# Patient Record
Sex: FEMALE | Race: ASIAN | Employment: UNEMPLOYED | URBAN - METROPOLITAN AREA
[De-identification: names, ages, dates, MRNs, and addresses within clinical notes are randomized per-mention and may not be internally consistent; named-entity substitution may affect disease eponyms.]

---

## 2017-12-28 ENCOUNTER — TELEPHONE (OUTPATIENT)
Dept: FAMILY MEDICINE CLINIC | Facility: CLINIC | Age: 57
End: 2017-12-28

## 2017-12-29 ENCOUNTER — OFFICE VISIT (OUTPATIENT)
Dept: FAMILY MEDICINE CLINIC | Facility: CLINIC | Age: 57
End: 2017-12-29

## 2017-12-29 ENCOUNTER — HOSPITAL ENCOUNTER (OUTPATIENT)
Dept: GENERAL RADIOLOGY | Age: 57
Discharge: HOME | End: 2017-12-29
Attending: FAMILY MEDICINE

## 2017-12-29 VITALS
HEIGHT: 62 IN | TEMPERATURE: 98 F | WEIGHT: 150 LBS | SYSTOLIC BLOOD PRESSURE: 122 MMHG | DIASTOLIC BLOOD PRESSURE: 70 MMHG | HEART RATE: 80 BPM | BODY MASS INDEX: 27.6 KG/M2 | RESPIRATION RATE: 12 BRPM

## 2017-12-29 DIAGNOSIS — R05.9 COUGH: Primary | ICD-10-CM

## 2017-12-29 DIAGNOSIS — R05.9 COUGH: ICD-10-CM

## 2017-12-29 DIAGNOSIS — J45.40 MODERATE PERSISTENT ASTHMA WITHOUT COMPLICATION: ICD-10-CM

## 2017-12-29 PROCEDURE — 99203 OFFICE O/P NEW LOW 30 MIN: CPT | Performed by: FAMILY MEDICINE

## 2017-12-29 PROCEDURE — 71020 XR CHEST PA + LAT CHEST (CPT=71020): CPT | Performed by: FAMILY MEDICINE

## 2017-12-29 RX ORDER — MONTELUKAST SODIUM 10 MG/1
10 TABLET ORAL NIGHTLY
COMMUNITY

## 2017-12-29 RX ORDER — FLUTICASONE PROPIONATE AND SALMETEROL 250; 50 UG/1; UG/1
2 POWDER RESPIRATORY (INHALATION) EVERY 12 HOURS SCHEDULED
COMMUNITY

## 2017-12-29 RX ORDER — BENZONATATE 100 MG/1
100 CAPSULE ORAL 3 TIMES DAILY PRN
Qty: 20 CAPSULE | Refills: 0 | Status: SHIPPED | OUTPATIENT
Start: 2017-12-29

## 2017-12-29 RX ORDER — GUAIFENESIN AND CODEINE PHOSPHATE 100; 10 MG/5ML; MG/5ML
SOLUTION ORAL
Qty: 120 ML | Refills: 0 | Status: SHIPPED | OUTPATIENT
Start: 2017-12-29 | End: 2018-01-12

## 2017-12-29 RX ORDER — LEVOFLOXACIN 500 MG/1
500 TABLET, FILM COATED ORAL DAILY
COMMUNITY
Start: 2017-12-27

## 2017-12-29 RX ORDER — GUAIFENESIN 600 MG
1200 TABLET, EXTENDED RELEASE 12 HR ORAL 2 TIMES DAILY
COMMUNITY
End: 2018-02-15

## 2017-12-29 RX ORDER — ALBUTEROL SULFATE 90 UG/1
AEROSOL, METERED RESPIRATORY (INHALATION) EVERY 6 HOURS PRN
COMMUNITY

## 2017-12-29 NOTE — PROGRESS NOTES
Vladimir Lomeli is a 62year old female and is new to our office. Patient presents today to establish care. The patient's history includes:  · Cough and cold x 3 days. Taking mucinex and started Levaquin x 3 days (gets from [facility]). No fever. daily    Exercise No     Social History Narrative   None on file       REVIEW OF SYSTEMS:  GENERAL: feels well otherwise  LUNGS: denies shortness of breath  CARDIOVASCULAR: denies chest pain    EXAM:  /70   Pulse 80   Temp 98.1 °F (36.7 °C) (Oral)

## 2018-01-03 ENCOUNTER — TELEPHONE (OUTPATIENT)
Dept: FAMILY MEDICINE CLINIC | Facility: CLINIC | Age: 58
End: 2018-01-03

## 2018-02-15 ENCOUNTER — OFFICE VISIT (OUTPATIENT)
Dept: FAMILY MEDICINE CLINIC | Facility: CLINIC | Age: 58
End: 2018-02-15

## 2018-02-15 VITALS
HEART RATE: 84 BPM | TEMPERATURE: 98 F | BODY MASS INDEX: 27.97 KG/M2 | WEIGHT: 152 LBS | HEIGHT: 62 IN | DIASTOLIC BLOOD PRESSURE: 80 MMHG | SYSTOLIC BLOOD PRESSURE: 120 MMHG

## 2018-02-15 DIAGNOSIS — J45.40 MODERATE PERSISTENT ASTHMA WITHOUT COMPLICATION: Primary | ICD-10-CM

## 2018-02-15 PROCEDURE — 99213 OFFICE O/P EST LOW 20 MIN: CPT | Performed by: FAMILY MEDICINE

## 2018-02-15 RX ORDER — PREDNISONE 10 MG/1
TABLET ORAL
Qty: 30 TABLET | Refills: 0 | Status: SHIPPED | OUTPATIENT
Start: 2018-02-15

## 2018-02-15 RX ORDER — ESOMEPRAZOLE MAGNESIUM 40 MG/1
CAPSULE, DELAYED RELEASE ORAL
COMMUNITY
Start: 2017-12-26

## 2018-02-16 NOTE — PROGRESS NOTES
HPI:   Sierra Carolina is a 62year old female. Cough x 6 weeks. Was seen at that time. Had normal CXR. Had started levaquin from [facility]. Then went on augments from dentist.  Feels sx did improve, but cough has persisted.   Takes advair d encounter diagnosis)  · Prednisone taper as directed. · Call/return if sx do not improve. No orders of the defined types were placed in this encounter.     Meds & Refills for this Visit:  Signed Prescriptions Disp Refills    predniSONE 10 MG Oral Tab 30

## 2019-01-08 ENCOUNTER — OFFICE VISIT (OUTPATIENT)
Dept: FAMILY MEDICINE | Age: 59
End: 2019-01-08

## 2019-01-08 ENCOUNTER — LAB SERVICES (OUTPATIENT)
Dept: LAB | Age: 59
End: 2019-01-08

## 2019-01-08 VITALS
WEIGHT: 153 LBS | SYSTOLIC BLOOD PRESSURE: 124 MMHG | DIASTOLIC BLOOD PRESSURE: 76 MMHG | BODY MASS INDEX: 28.16 KG/M2 | HEART RATE: 108 BPM | HEIGHT: 62 IN | TEMPERATURE: 97.4 F | RESPIRATION RATE: 16 BRPM

## 2019-01-08 DIAGNOSIS — D50.9 IRON DEFICIENCY ANEMIA, UNSPECIFIED IRON DEFICIENCY ANEMIA TYPE: Primary | ICD-10-CM

## 2019-01-08 DIAGNOSIS — D64.9 ANEMIA, UNSPECIFIED TYPE: ICD-10-CM

## 2019-01-08 DIAGNOSIS — E87.6 HYPOKALEMIA: ICD-10-CM

## 2019-01-08 DIAGNOSIS — J45.40 MODERATE PERSISTENT ASTHMA WITHOUT COMPLICATION: ICD-10-CM

## 2019-01-08 DIAGNOSIS — D50.9 IRON DEFICIENCY ANEMIA, UNSPECIFIED IRON DEFICIENCY ANEMIA TYPE: ICD-10-CM

## 2019-01-08 LAB
25(OH)D3 SERPL-MCNC: 37.5 NG/ML (ref 30–100)
BUN SERPL-MCNC: 12 MG/DL (ref 7–20)
CALCIUM SERPL-MCNC: 9.9 MG/DL (ref 8.6–10.6)
CHLORIDE SERPL-SCNC: 103 MMOL/L (ref 96–107)
CO2 SERPL-SCNC: 29 MMOL/L (ref 22–32)
CREAT SERPL-MCNC: 0.7 MG/DL (ref 0.5–1.4)
DIFFERENTIAL TYPE: ABNORMAL
FERRITIN SERPL-MCNC: 99 NG/ML (ref 11–264)
GFR SERPL CREATININE-BSD FRML MDRD: >60 ML/MIN/{1.73M2}
GFR SERPL CREATININE-BSD FRML MDRD: >60 ML/MIN/{1.73M2}
GLUCOSE SERPL-MCNC: 93 MG/DL (ref 70–200)
HEMATOCRIT: 40.2 % (ref 34–45)
HEMOGLOBIN: 12.4 G/DL (ref 11.2–15.7)
IRON SATN MFR SERPL: 18 % (ref 9–55)
IRON SERPL-MCNC: 64 UG/DL (ref 37–170)
LYMPH PERCENT: 21.6 % (ref 20.5–51.1)
LYMPHOCYTE ABSOLUTE #: 1.3 10*3/UL (ref 1.2–3.4)
MEAN CORPUSCULAR HGB CONCENTRATION: 30.8 % (ref 32–36)
MEAN CORPUSCULAR HGB: 21.8 PG (ref 27–34)
MEAN CORPUSCULAR VOLUME: 70.7 FL (ref 79–95)
MEAN PLATELET VOLUME: 10.1 FL (ref 8.6–12.4)
MIXED %: 10.6 % (ref 4.3–12.9)
MIXED ABSOLUTE #: 0.7 10*3/UL (ref 0.2–0.9)
NEUTROPHIL ABSOLUTE #: 4.2 10*3/UL (ref 1.4–6.5)
NEUTROPHIL PERCENT: 67.8 % (ref 34–73.5)
PLATELET COUNT: 328 10*3/UL (ref 150–400)
POTASSIUM SERPL-SCNC: 3.8 MMOL/L (ref 3.5–5.3)
RED BLOOD CELL COUNT: 5.69 10*6/UL (ref 3.7–5.2)
RED CELL DISTRIBUTION WIDTH: 14.1 % (ref 11.3–14.8)
SODIUM SERPL-SCNC: 141 MMOL/L (ref 136–146)
TIBC SERPL-MCNC: 353 UG/DL (ref 250–450)
WHITE BLOOD CELL COUNT: 6.2 10*3/UL (ref 4–10)

## 2019-01-08 PROCEDURE — 99203 OFFICE O/P NEW LOW 30 MIN: CPT | Performed by: FAMILY MEDICINE

## 2019-01-08 PROCEDURE — 83550 IRON BINDING TEST: CPT | Performed by: FAMILY MEDICINE

## 2019-01-08 PROCEDURE — 36415 COLL VENOUS BLD VENIPUNCTURE: CPT | Performed by: FAMILY MEDICINE

## 2019-01-08 PROCEDURE — 82728 ASSAY OF FERRITIN: CPT | Performed by: FAMILY MEDICINE

## 2019-01-08 PROCEDURE — 85025 COMPLETE CBC W/AUTO DIFF WBC: CPT | Performed by: FAMILY MEDICINE

## 2019-01-08 PROCEDURE — 80048 BASIC METABOLIC PNL TOTAL CA: CPT | Performed by: FAMILY MEDICINE

## 2019-01-08 PROCEDURE — 83540 ASSAY OF IRON: CPT | Performed by: FAMILY MEDICINE

## 2019-01-08 PROCEDURE — 82306 VITAMIN D 25 HYDROXY: CPT | Performed by: FAMILY MEDICINE

## 2019-01-08 RX ORDER — MONTELUKAST SODIUM 10 MG/1
10 TABLET ORAL EVERY EVENING
Qty: 30 TABLET | Refills: 5 | Status: SHIPPED | OUTPATIENT
Start: 2019-01-08 | End: 2019-12-03 | Stop reason: SDUPTHER

## 2019-01-08 RX ORDER — ALBUTEROL SULFATE 90 UG/1
1 AEROSOL, METERED RESPIRATORY (INHALATION) EVERY 6 HOURS PRN
COMMUNITY
End: 2019-05-07 | Stop reason: SDUPTHER

## 2019-01-08 RX ORDER — CETIRIZINE HYDROCHLORIDE 10 MG/1
10 TABLET ORAL DAILY PRN
COMMUNITY
Start: 2018-12-05 | End: 2019-09-03 | Stop reason: SDUPTHER

## 2019-01-08 RX ORDER — ESOMEPRAZOLE MAGNESIUM 40 MG/1
40 CAPSULE, DELAYED RELEASE ORAL DAILY
Qty: 30 CAPSULE | Refills: 5 | Status: SHIPPED | OUTPATIENT
Start: 2019-01-08 | End: 2019-05-20 | Stop reason: SDUPTHER

## 2019-01-08 RX ORDER — ALBUTEROL SULFATE 90 UG/1
2 AEROSOL, METERED RESPIRATORY (INHALATION) EVERY 6 HOURS PRN
Qty: 1 INHALER | Refills: 5 | Status: SHIPPED | OUTPATIENT
Start: 2019-01-08 | End: 2022-05-04 | Stop reason: SDUPTHER

## 2019-01-08 RX ORDER — FLUTICASONE PROPIONATE AND SALMETEROL 250; 50 UG/1; UG/1
1 POWDER RESPIRATORY (INHALATION) DAILY
Qty: 60 EACH | Refills: 2 | Status: SHIPPED | OUTPATIENT
Start: 2019-01-08 | End: 2019-05-09 | Stop reason: DRUGHIGH

## 2019-01-08 RX ORDER — FLUTICASONE PROPIONATE AND SALMETEROL 250; 50 UG/1; UG/1
2 POWDER RESPIRATORY (INHALATION) 2 TIMES DAILY
COMMUNITY
End: 2019-01-08 | Stop reason: SDUPTHER

## 2019-01-08 RX ORDER — MONTELUKAST SODIUM 10 MG/1
10 TABLET ORAL EVERY EVENING
Qty: 30 TABLET | Refills: 5 | Status: SHIPPED | COMMUNITY
Start: 2019-01-08 | End: 2019-01-08 | Stop reason: SDUPTHER

## 2019-01-08 RX ORDER — ESOMEPRAZOLE MAGNESIUM 40 MG/1
40 CAPSULE, DELAYED RELEASE ORAL DAILY
COMMUNITY
Start: 2017-12-26 | End: 2019-01-08 | Stop reason: SDUPTHER

## 2019-01-08 SDOH — HEALTH STABILITY: MENTAL HEALTH: HOW OFTEN DO YOU HAVE A DRINK CONTAINING ALCOHOL?: NEVER

## 2019-01-15 PROBLEM — J45.40 MODERATE PERSISTENT ASTHMA WITHOUT COMPLICATION: Status: ACTIVE | Noted: 2017-12-29

## 2019-03-14 ENCOUNTER — E-ADVICE (OUTPATIENT)
Dept: FAMILY MEDICINE | Age: 59
End: 2019-03-14

## 2019-03-18 RX ORDER — PREDNISONE 5 MG/1
25 TABLET ORAL DAILY
Qty: 25 TABLET | Refills: 0 | Status: SHIPPED | OUTPATIENT
Start: 2019-03-18 | End: 2019-03-23

## 2019-04-23 ENCOUNTER — E-ADVICE (OUTPATIENT)
Dept: FAMILY MEDICINE | Age: 59
End: 2019-04-23

## 2019-04-23 DIAGNOSIS — J45.909 ASTHMA, UNSPECIFIED ASTHMA SEVERITY, UNSPECIFIED WHETHER COMPLICATED, UNSPECIFIED WHETHER PERSISTENT: Primary | ICD-10-CM

## 2019-05-07 ENCOUNTER — LAB SERVICES (OUTPATIENT)
Dept: LAB | Age: 59
End: 2019-05-07

## 2019-05-07 ENCOUNTER — OFFICE VISIT (OUTPATIENT)
Dept: PULMONOLOGY | Age: 59
End: 2019-05-07
Attending: FAMILY MEDICINE

## 2019-05-07 VITALS
HEIGHT: 63 IN | OXYGEN SATURATION: 98 % | DIASTOLIC BLOOD PRESSURE: 60 MMHG | WEIGHT: 155 LBS | SYSTOLIC BLOOD PRESSURE: 106 MMHG | RESPIRATION RATE: 16 BRPM | TEMPERATURE: 98.2 F | BODY MASS INDEX: 27.46 KG/M2 | HEART RATE: 80 BPM

## 2019-05-07 DIAGNOSIS — J32.9 RHINOSINUSITIS: ICD-10-CM

## 2019-05-07 DIAGNOSIS — J30.9 ALLERGIC RHINITIS, UNSPECIFIED SEASONALITY, UNSPECIFIED TRIGGER: ICD-10-CM

## 2019-05-07 DIAGNOSIS — K21.9 GASTROESOPHAGEAL REFLUX DISEASE, ESOPHAGITIS PRESENCE NOT SPECIFIED: ICD-10-CM

## 2019-05-07 DIAGNOSIS — J45.50 SEVERE PERSISTENT ASTHMA WITHOUT COMPLICATION: ICD-10-CM

## 2019-05-07 DIAGNOSIS — J45.50 SEVERE PERSISTENT ASTHMA WITHOUT COMPLICATION: Primary | ICD-10-CM

## 2019-05-07 PROCEDURE — 86003 ALLG SPEC IGE CRUDE XTRC EA: CPT | Performed by: INTERNAL MEDICINE

## 2019-05-07 PROCEDURE — 82785 ASSAY OF IGE: CPT | Performed by: INTERNAL MEDICINE

## 2019-05-07 PROCEDURE — 99244 OFF/OP CNSLTJ NEW/EST MOD 40: CPT | Performed by: INTERNAL MEDICINE

## 2019-05-07 PROCEDURE — 36415 COLL VENOUS BLD VENIPUNCTURE: CPT | Performed by: INTERNAL MEDICINE

## 2019-05-07 PROCEDURE — 94010 BREATHING CAPACITY TEST: CPT | Performed by: INTERNAL MEDICINE

## 2019-05-07 PROCEDURE — 85025 COMPLETE CBC W/AUTO DIFF WBC: CPT | Performed by: INTERNAL MEDICINE

## 2019-05-07 SDOH — HEALTH STABILITY: MENTAL HEALTH: HOW OFTEN DO YOU HAVE A DRINK CONTAINING ALCOHOL?: NEVER

## 2019-05-08 LAB
ANISOCYTOSIS: NORMAL
BASOPHIL %: 0.3 % (ref 0–1.2)
BASOPHIL ABSOLUTE #: 0 10*3/UL (ref 0–0.1)
DIFFERENTIAL TYPE: ABNORMAL
EOSINOPHIL %: 1.1 % (ref 0–10)
EOSINOPHIL ABSOLUTE #: 0.1 10*3/UL (ref 0–0.5)
HEMATOCRIT: 38.7 % (ref 34–45)
HEMOGLOBIN: 11.8 G/DL (ref 11.2–15.7)
HONEY BEE IGE QN: <0.1 KU/L (ref 0–0.1)
HYPOCHROMASIA: NORMAL
LYMPH PERCENT: 20.3 % (ref 20.5–51.1)
LYMPHOCYTE ABSOLUTE #: 1.6 10*3/UL (ref 1.2–3.4)
MEAN CORPUSCULAR HGB CONCENTRATION: 30.5 % (ref 32–36)
MEAN CORPUSCULAR HGB: 21.3 PG (ref 27–34)
MEAN CORPUSCULAR VOLUME: 69.9 FL (ref 79–95)
MEAN PLATELET VOLUME: 11.4 FL (ref 8.6–12.4)
MICROCYTES: NORMAL
MONOCYTE ABSOLUTE #: 0.6 10*3/UL (ref 0.2–0.9)
MONOCYTE PERCENT: 8.1 % (ref 4.3–12.9)
NEUTROPHIL ABSOLUTE #: 5.6 10*3/UL (ref 1.4–6.5)
NEUTROPHIL PERCENT: 70.2 % (ref 34–73.5)
PAPER WASP IGE QN: <0.1 KU/L (ref 0–0.1)
PLATELET COUNT: 400 10*3/UL (ref 150–400)
RED BLOOD CELL COUNT: 5.54 10*6/UL (ref 3.7–5.2)
RED CELL DISTRIBUTION WIDTH: 16.1 % (ref 11.3–14.8)
TOTAL IGE SMQN RAST: <2 KU/L (ref 0–100)
WHITE BLOOD CELL COUNT: 7.9 10*3/UL (ref 4–10)
WHITEFACED HORNET IGE QN: <0.1 KU/L (ref 0–0.1)
YELLOW HORNET IGE QN: <0.1 KU/L (ref 0–0.1)
YELLOW JACKET IGE QN: <0.1 KU/L (ref 0–0.1)

## 2019-05-10 RX ORDER — FLUTICASONE PROPIONATE AND SALMETEROL 500; 50 UG/1; UG/1
1 POWDER RESPIRATORY (INHALATION) 2 TIMES DAILY
Qty: 1 EACH | Refills: 3 | Status: SHIPPED | OUTPATIENT
Start: 2019-05-10 | End: 2019-08-07 | Stop reason: DRUGHIGH

## 2019-05-20 ENCOUNTER — OFFICE VISIT (OUTPATIENT)
Dept: GASTROENTEROLOGY | Age: 59
End: 2019-05-20
Attending: INTERNAL MEDICINE

## 2019-05-20 VITALS
HEIGHT: 63 IN | SYSTOLIC BLOOD PRESSURE: 104 MMHG | WEIGHT: 147 LBS | DIASTOLIC BLOOD PRESSURE: 72 MMHG | BODY MASS INDEX: 26.05 KG/M2

## 2019-05-20 DIAGNOSIS — Z12.11 SPECIAL SCREENING FOR MALIGNANT NEOPLASMS, COLON: ICD-10-CM

## 2019-05-20 DIAGNOSIS — K21.9 GERD WITHOUT ESOPHAGITIS: Primary | ICD-10-CM

## 2019-05-20 PROCEDURE — 99244 OFF/OP CNSLTJ NEW/EST MOD 40: CPT | Performed by: INTERNAL MEDICINE

## 2019-05-20 RX ORDER — SIMETHICONE 125 MG
TABLET,CHEWABLE ORAL
Qty: 2 TABLET | Refills: 0 | Status: SHIPPED | OUTPATIENT
Start: 2019-05-20 | End: 2019-07-04

## 2019-05-20 RX ORDER — SODIUM, POTASSIUM,MAG SULFATES 17.5-3.13G
SOLUTION, RECONSTITUTED, ORAL ORAL
Qty: 1 BOTTLE | Refills: 0 | Status: SHIPPED | OUTPATIENT
Start: 2019-05-20 | End: 2019-07-04

## 2019-05-20 RX ORDER — ESOMEPRAZOLE MAGNESIUM 40 MG/1
40 CAPSULE, DELAYED RELEASE ORAL 2 TIMES DAILY
Qty: 30 CAPSULE | Refills: 6 | Status: SHIPPED | OUTPATIENT
Start: 2019-05-20 | End: 2019-08-30 | Stop reason: SDUPTHER

## 2019-05-20 RX ORDER — BISACODYL 5 MG/1
TABLET, DELAYED RELEASE ORAL
Qty: 2 TABLET | Refills: 0 | Status: SHIPPED | OUTPATIENT
Start: 2019-05-20 | End: 2019-07-04

## 2019-06-26 ENCOUNTER — OFFICE VISIT (OUTPATIENT)
Dept: GASTROENTEROLOGY | Age: 59
End: 2019-06-26
Attending: INTERNAL MEDICINE

## 2019-06-26 DIAGNOSIS — Z12.11 SPECIAL SCREENING FOR MALIGNANT NEOPLASMS, COLON: Primary | ICD-10-CM

## 2019-06-26 DIAGNOSIS — K21.9 GASTRO-ESOPHAGEAL REFLUX DISEASE WITHOUT ESOPHAGITIS: ICD-10-CM

## 2019-06-26 DIAGNOSIS — D12.5 BENIGN NEOPLASM OF SIGMOID COLON: ICD-10-CM

## 2019-06-26 DIAGNOSIS — K21.9 GERD WITHOUT ESOPHAGITIS: ICD-10-CM

## 2019-06-26 DIAGNOSIS — Z12.11 ENCOUNTER FOR SCREENING FOR MALIGNANT NEOPLASM OF COLON: ICD-10-CM

## 2019-06-26 DIAGNOSIS — D12.8 BENIGN NEOPLASM OF RECTUM: ICD-10-CM

## 2019-06-26 PROCEDURE — 45380 COLONOSCOPY AND BIOPSY: CPT | Performed by: INTERNAL MEDICINE

## 2019-06-26 PROCEDURE — 45385 COLONOSCOPY W/LESION REMOVAL: CPT | Performed by: INTERNAL MEDICINE

## 2019-06-26 PROCEDURE — 88305 TISSUE EXAM BY PATHOLOGIST: CPT | Performed by: PATHOLOGY

## 2019-06-26 PROCEDURE — 43239 EGD BIOPSY SINGLE/MULTIPLE: CPT | Performed by: INTERNAL MEDICINE

## 2019-07-01 ENCOUNTER — E-ADVICE (OUTPATIENT)
Dept: GASTROENTEROLOGY | Age: 59
End: 2019-07-01

## 2019-07-01 LAB — AP REPORT: NORMAL

## 2019-07-15 ENCOUNTER — APPOINTMENT (OUTPATIENT)
Dept: PULMONOLOGY | Age: 59
End: 2019-07-15
Attending: FAMILY MEDICINE

## 2019-08-02 ENCOUNTER — APPOINTMENT (OUTPATIENT)
Dept: PULMONOLOGY | Age: 59
End: 2019-08-02

## 2019-08-07 ENCOUNTER — OFFICE VISIT (OUTPATIENT)
Dept: PULMONOLOGY | Age: 59
End: 2019-08-07

## 2019-08-07 VITALS
HEART RATE: 78 BPM | SYSTOLIC BLOOD PRESSURE: 98 MMHG | RESPIRATION RATE: 14 BRPM | TEMPERATURE: 98.2 F | WEIGHT: 148 LBS | OXYGEN SATURATION: 96 % | BODY MASS INDEX: 27.23 KG/M2 | DIASTOLIC BLOOD PRESSURE: 60 MMHG | HEIGHT: 62 IN

## 2019-08-07 DIAGNOSIS — J32.9 RHINOSINUSITIS: ICD-10-CM

## 2019-08-07 DIAGNOSIS — K21.9 GASTROESOPHAGEAL REFLUX DISEASE WITHOUT ESOPHAGITIS: ICD-10-CM

## 2019-08-07 DIAGNOSIS — J45.50 SEVERE PERSISTENT ASTHMA WITHOUT COMPLICATION: Primary | ICD-10-CM

## 2019-08-07 PROCEDURE — 99214 OFFICE O/P EST MOD 30 MIN: CPT | Performed by: INTERNAL MEDICINE

## 2019-08-07 RX ORDER — FLUTICASONE PROPIONATE AND SALMETEROL 250; 50 UG/1; UG/1
1 POWDER RESPIRATORY (INHALATION) 2 TIMES DAILY
Qty: 1 EACH | Refills: 5 | Status: SHIPPED | OUTPATIENT
Start: 2019-08-07 | End: 2019-08-09 | Stop reason: DRUGHIGH

## 2019-08-07 SDOH — HEALTH STABILITY: MENTAL HEALTH: HOW OFTEN DO YOU HAVE A DRINK CONTAINING ALCOHOL?: NEVER

## 2019-08-09 ENCOUNTER — TELEPHONE (OUTPATIENT)
Dept: PULMONOLOGY | Age: 59
End: 2019-08-09

## 2019-08-09 DIAGNOSIS — J45.40 MODERATE PERSISTENT ASTHMA WITHOUT COMPLICATION: Primary | ICD-10-CM

## 2019-08-09 RX ORDER — FLUTICASONE PROPIONATE AND SALMETEROL 250; 50 UG/1; UG/1
2 POWDER RESPIRATORY (INHALATION) 2 TIMES DAILY
Qty: 1 EACH | Refills: 5 | Status: SHIPPED | OUTPATIENT
Start: 2019-08-09 | End: 2020-01-27 | Stop reason: SDUPTHER

## 2019-08-12 ENCOUNTER — APPOINTMENT (OUTPATIENT)
Dept: PULMONOLOGY | Age: 59
End: 2019-08-12

## 2019-08-16 ENCOUNTER — WALK IN (OUTPATIENT)
Dept: URGENT CARE | Age: 59
End: 2019-08-16

## 2019-08-16 VITALS
OXYGEN SATURATION: 99 % | HEART RATE: 74 BPM | TEMPERATURE: 98.1 F | SYSTOLIC BLOOD PRESSURE: 116 MMHG | RESPIRATION RATE: 16 BRPM | DIASTOLIC BLOOD PRESSURE: 59 MMHG

## 2019-08-16 DIAGNOSIS — B02.9 HERPES ZOSTER WITHOUT COMPLICATION: Primary | ICD-10-CM

## 2019-08-16 PROCEDURE — 99204 OFFICE O/P NEW MOD 45 MIN: CPT | Performed by: INTERNAL MEDICINE

## 2019-08-16 RX ORDER — VALACYCLOVIR HYDROCHLORIDE 1 G/1
1000 TABLET, FILM COATED ORAL 3 TIMES DAILY
Qty: 21 TABLET | Refills: 0 | Status: SHIPPED | OUTPATIENT
Start: 2019-08-16 | End: 2019-08-23

## 2019-08-16 RX ORDER — PREDNISONE 20 MG/1
TABLET ORAL
Qty: 18 TABLET | Refills: 0 | Status: SHIPPED | OUTPATIENT
Start: 2019-08-16 | End: 2019-08-25

## 2019-08-16 RX ORDER — PREDNISONE 10 MG/1
TABLET ORAL
COMMUNITY
Start: 2018-02-15 | End: 2020-01-27 | Stop reason: ALTCHOICE

## 2019-08-16 SDOH — HEALTH STABILITY: MENTAL HEALTH: HOW OFTEN DO YOU HAVE A DRINK CONTAINING ALCOHOL?: NEVER

## 2019-09-04 RX ORDER — ESOMEPRAZOLE MAGNESIUM 40 MG/1
40 CAPSULE, DELAYED RELEASE ORAL 2 TIMES DAILY
Qty: 180 CAPSULE | Refills: 1 | Status: SHIPPED | OUTPATIENT
Start: 2019-09-04 | End: 2019-09-06 | Stop reason: SDUPTHER

## 2019-09-05 RX ORDER — CETIRIZINE HYDROCHLORIDE 10 MG/1
10 TABLET ORAL DAILY PRN
Qty: 90 TABLET | Refills: 0 | Status: SHIPPED | OUTPATIENT
Start: 2019-09-05 | End: 2019-12-22 | Stop reason: SDUPTHER

## 2019-09-06 ENCOUNTER — TELEPHONE (OUTPATIENT)
Dept: GASTROENTEROLOGY | Age: 59
End: 2019-09-06

## 2019-09-06 RX ORDER — ESOMEPRAZOLE MAGNESIUM 40 MG/1
40 CAPSULE, DELAYED RELEASE ORAL 2 TIMES DAILY
Qty: 30 CAPSULE | Refills: 6 | Status: SHIPPED | OUTPATIENT
Start: 2019-09-06 | End: 2020-01-27 | Stop reason: SDUPTHER

## 2019-09-21 ENCOUNTER — E-ADVICE (OUTPATIENT)
Dept: FAMILY MEDICINE | Age: 59
End: 2019-09-21

## 2019-09-21 DIAGNOSIS — J45.40 MODERATE PERSISTENT ASTHMA WITHOUT COMPLICATION: ICD-10-CM

## 2019-09-23 RX ORDER — FLUTICASONE PROPIONATE AND SALMETEROL 250; 50 UG/1; UG/1
2 POWDER RESPIRATORY (INHALATION) 2 TIMES DAILY
Qty: 1 EACH | Refills: 5 | OUTPATIENT
Start: 2019-09-23 | End: 2020-09-22

## 2019-09-24 DIAGNOSIS — J45.40 MODERATE PERSISTENT ASTHMA WITHOUT COMPLICATION: ICD-10-CM

## 2019-09-25 RX ORDER — FLUTICASONE PROPIONATE AND SALMETEROL 250; 50 UG/1; UG/1
POWDER RESPIRATORY (INHALATION)
Refills: 0 | OUTPATIENT
Start: 2019-09-25

## 2019-09-26 ENCOUNTER — TELEPHONE (OUTPATIENT)
Dept: PULMONOLOGY | Age: 59
End: 2019-09-26

## 2019-10-16 DIAGNOSIS — Z23 NEED FOR PROPHYLACTIC VACCINATION AND INOCULATION AGAINST INFLUENZA: Primary | ICD-10-CM

## 2019-10-16 PROCEDURE — 90471 IMMUNIZATION ADMIN: CPT

## 2019-10-16 PROCEDURE — 90688 IIV4 VACCINE SPLT 0.5 ML IM: CPT

## 2019-11-27 ENCOUNTER — E-ADVICE (OUTPATIENT)
Dept: GASTROENTEROLOGY | Age: 59
End: 2019-11-27

## 2019-12-04 RX ORDER — MONTELUKAST SODIUM 10 MG/1
10 TABLET ORAL EVERY EVENING
Qty: 30 TABLET | Refills: 0 | Status: SHIPPED | OUTPATIENT
Start: 2019-12-04 | End: 2020-01-03 | Stop reason: SDUPTHER

## 2019-12-16 ENCOUNTER — TELEPHONE (OUTPATIENT)
Dept: GASTROENTEROLOGY | Age: 59
End: 2019-12-16

## 2019-12-23 RX ORDER — CETIRIZINE HYDROCHLORIDE 10 MG/1
10 TABLET ORAL DAILY PRN
Qty: 90 TABLET | Refills: 0 | Status: SHIPPED | OUTPATIENT
Start: 2019-12-23 | End: 2020-05-18 | Stop reason: SDUPTHER

## 2020-01-04 RX ORDER — MONTELUKAST SODIUM 10 MG/1
10 TABLET ORAL EVERY EVENING
Qty: 30 TABLET | Refills: 0 | Status: SHIPPED | OUTPATIENT
Start: 2020-01-04 | End: 2020-01-27 | Stop reason: SDUPTHER

## 2020-01-20 ENCOUNTER — TELEPHONE (OUTPATIENT)
Dept: PULMONOLOGY | Age: 60
End: 2020-01-20

## 2020-01-27 ENCOUNTER — OFFICE VISIT (OUTPATIENT)
Dept: PULMONOLOGY | Age: 60
End: 2020-01-27

## 2020-01-27 VITALS
OXYGEN SATURATION: 98 % | BODY MASS INDEX: 28.89 KG/M2 | TEMPERATURE: 98 F | DIASTOLIC BLOOD PRESSURE: 70 MMHG | WEIGHT: 157 LBS | HEART RATE: 84 BPM | SYSTOLIC BLOOD PRESSURE: 112 MMHG | RESPIRATION RATE: 16 BRPM | HEIGHT: 62 IN

## 2020-01-27 DIAGNOSIS — J45.50 SEVERE PERSISTENT ASTHMA WITHOUT COMPLICATION: Primary | ICD-10-CM

## 2020-01-27 DIAGNOSIS — K21.9 GASTROESOPHAGEAL REFLUX DISEASE, ESOPHAGITIS PRESENCE NOT SPECIFIED: ICD-10-CM

## 2020-01-27 DIAGNOSIS — Z23 NEED FOR PNEUMOCOCCAL VACCINATION: ICD-10-CM

## 2020-01-27 DIAGNOSIS — J32.9 RHINOSINUSITIS: ICD-10-CM

## 2020-01-27 PROCEDURE — 99214 OFFICE O/P EST MOD 30 MIN: CPT | Performed by: INTERNAL MEDICINE

## 2020-01-27 PROCEDURE — G0009 ADMIN PNEUMOCOCCAL VACCINE: HCPCS

## 2020-01-27 PROCEDURE — 90732 PPSV23 VACC 2 YRS+ SUBQ/IM: CPT

## 2020-01-27 RX ORDER — FLUTICASONE PROPIONATE AND SALMETEROL 250; 50 UG/1; UG/1
2 POWDER RESPIRATORY (INHALATION) 2 TIMES DAILY
Qty: 1 EACH | Refills: 5 | Status: SHIPPED | OUTPATIENT
Start: 2020-01-27 | End: 2020-05-14

## 2020-01-27 RX ORDER — MONTELUKAST SODIUM 10 MG/1
10 TABLET ORAL EVERY EVENING
Qty: 30 TABLET | Refills: 5 | Status: SHIPPED | OUTPATIENT
Start: 2020-01-27 | End: 2020-05-13 | Stop reason: SDUPTHER

## 2020-01-27 RX ORDER — ESOMEPRAZOLE MAGNESIUM 40 MG/1
40 CAPSULE, DELAYED RELEASE ORAL 2 TIMES DAILY
Qty: 60 CAPSULE | Refills: 1 | Status: SHIPPED | OUTPATIENT
Start: 2020-01-27 | End: 2020-03-10 | Stop reason: SDUPTHER

## 2020-01-27 SDOH — HEALTH STABILITY: MENTAL HEALTH: HOW OFTEN DO YOU HAVE A DRINK CONTAINING ALCOHOL?: NEVER

## 2020-02-07 ENCOUNTER — APPOINTMENT (OUTPATIENT)
Dept: PULMONOLOGY | Age: 60
End: 2020-02-07

## 2020-03-10 RX ORDER — ESOMEPRAZOLE MAGNESIUM 40 MG/1
CAPSULE, DELAYED RELEASE ORAL
Qty: 60 CAPSULE | Refills: 5 | Status: SHIPPED | OUTPATIENT
Start: 2020-03-10 | End: 2020-05-14

## 2020-05-13 RX ORDER — MONTELUKAST SODIUM 10 MG/1
10 TABLET ORAL EVERY EVENING
Qty: 30 TABLET | Refills: 5 | Status: SHIPPED | OUTPATIENT
Start: 2020-05-13 | End: 2020-07-27 | Stop reason: SDUPTHER

## 2020-05-14 RX ORDER — ESOMEPRAZOLE MAGNESIUM 40 MG/1
CAPSULE, DELAYED RELEASE ORAL
Qty: 60 CAPSULE | Refills: 5 | Status: SHIPPED | OUTPATIENT
Start: 2020-05-14 | End: 2020-09-02

## 2020-05-14 RX ORDER — FLUTICASONE PROPIONATE AND SALMETEROL 250; 50 UG/1; UG/1
POWDER RESPIRATORY (INHALATION)
Qty: 60 EACH | Refills: 3 | Status: SHIPPED | OUTPATIENT
Start: 2020-05-14 | End: 2020-07-24 | Stop reason: SDUPTHER

## 2020-05-18 ENCOUNTER — V-VISIT (OUTPATIENT)
Dept: FAMILY MEDICINE | Age: 60
End: 2020-05-18

## 2020-05-18 DIAGNOSIS — L30.4 INTERTRIGO: ICD-10-CM

## 2020-05-18 DIAGNOSIS — J45.909 ASTHMA, UNSPECIFIED ASTHMA SEVERITY, UNSPECIFIED WHETHER COMPLICATED, UNSPECIFIED WHETHER PERSISTENT: ICD-10-CM

## 2020-05-18 DIAGNOSIS — Z12.39 SCREENING FOR BREAST CANCER: ICD-10-CM

## 2020-05-18 DIAGNOSIS — J30.1 SEASONAL ALLERGIC RHINITIS DUE TO POLLEN: ICD-10-CM

## 2020-05-18 DIAGNOSIS — H43.391 VITREOUS FLOATERS OF RIGHT EYE: ICD-10-CM

## 2020-05-18 DIAGNOSIS — E87.6 HYPOKALEMIA: ICD-10-CM

## 2020-05-18 DIAGNOSIS — D50.9 IRON DEFICIENCY ANEMIA, UNSPECIFIED IRON DEFICIENCY ANEMIA TYPE: ICD-10-CM

## 2020-05-18 DIAGNOSIS — H53.9 VISION CHANGES: ICD-10-CM

## 2020-05-18 DIAGNOSIS — J45.40 MODERATE PERSISTENT ASTHMA WITHOUT COMPLICATION: Primary | ICD-10-CM

## 2020-05-18 PROCEDURE — 99214 OFFICE O/P EST MOD 30 MIN: CPT | Performed by: FAMILY MEDICINE

## 2020-05-18 RX ORDER — CETIRIZINE HYDROCHLORIDE 10 MG/1
10 TABLET ORAL DAILY PRN
Qty: 90 TABLET | Refills: 1 | Status: SHIPPED | OUTPATIENT
Start: 2020-05-18 | End: 2020-12-11

## 2020-05-18 RX ORDER — FLUTICASONE PROPIONATE AND SALMETEROL 250; 50 UG/1; UG/1
1 POWDER RESPIRATORY (INHALATION)
COMMUNITY
End: 2020-07-27 | Stop reason: SDUPTHER

## 2020-05-18 SDOH — HEALTH STABILITY: MENTAL HEALTH: HOW OFTEN DO YOU HAVE A DRINK CONTAINING ALCOHOL?: NEVER

## 2020-05-18 ASSESSMENT — PATIENT HEALTH QUESTIONNAIRE - PHQ9
SUM OF ALL RESPONSES TO PHQ9 QUESTIONS 1 AND 2: 0
1. LITTLE INTEREST OR PLEASURE IN DOING THINGS: NOT AT ALL
2. FEELING DOWN, DEPRESSED OR HOPELESS: NOT AT ALL
SUM OF ALL RESPONSES TO PHQ9 QUESTIONS 1 AND 2: 0

## 2020-05-20 ENCOUNTER — E-ADVICE (OUTPATIENT)
Dept: OPHTHALMOLOGY | Age: 60
End: 2020-05-20

## 2020-05-20 RX ORDER — CETIRIZINE HYDROCHLORIDE 10 MG/1
10 TABLET ORAL DAILY PRN
Qty: 90 TABLET | Refills: 0 | OUTPATIENT
Start: 2020-05-20

## 2020-05-28 ENCOUNTER — OFFICE VISIT (OUTPATIENT)
Dept: OPHTHALMOLOGY | Age: 60
End: 2020-05-28
Attending: FAMILY MEDICINE

## 2020-05-28 DIAGNOSIS — H43.812 PVD (POSTERIOR VITREOUS DETACHMENT), LEFT EYE: Primary | ICD-10-CM

## 2020-05-28 PROCEDURE — 92004 COMPRE OPH EXAM NEW PT 1/>: CPT | Performed by: OPHTHALMOLOGY

## 2020-05-28 SDOH — HEALTH STABILITY: MENTAL HEALTH: HOW OFTEN DO YOU HAVE A DRINK CONTAINING ALCOHOL?: NEVER

## 2020-06-20 ENCOUNTER — OFFICE VISIT (OUTPATIENT)
Dept: OPHTHALMOLOGY | Age: 60
End: 2020-06-20

## 2020-06-20 ENCOUNTER — LAB SERVICES (OUTPATIENT)
Dept: LAB | Age: 60
End: 2020-06-20

## 2020-06-20 DIAGNOSIS — L30.4 INTERTRIGO: ICD-10-CM

## 2020-06-20 DIAGNOSIS — J45.909 ASTHMA, UNSPECIFIED ASTHMA SEVERITY, UNSPECIFIED WHETHER COMPLICATED, UNSPECIFIED WHETHER PERSISTENT: ICD-10-CM

## 2020-06-20 DIAGNOSIS — J45.40 MODERATE PERSISTENT ASTHMA WITHOUT COMPLICATION: ICD-10-CM

## 2020-06-20 DIAGNOSIS — E87.6 HYPOKALEMIA: ICD-10-CM

## 2020-06-20 DIAGNOSIS — H43.812 PVD (POSTERIOR VITREOUS DETACHMENT), LEFT EYE: Primary | ICD-10-CM

## 2020-06-20 DIAGNOSIS — J30.1 SEASONAL ALLERGIC RHINITIS DUE TO POLLEN: ICD-10-CM

## 2020-06-20 DIAGNOSIS — D50.9 IRON DEFICIENCY ANEMIA, UNSPECIFIED IRON DEFICIENCY ANEMIA TYPE: ICD-10-CM

## 2020-06-20 LAB
25(OH)D3 SERPL-MCNC: 24.8 NG/ML (ref 30–100)
ALBUMIN SERPL-MCNC: 4.1 G/DL (ref 3.6–5.1)
ALP SERPL-CCNC: 112 U/L (ref 45–130)
ALT SERPL W/O P-5'-P-CCNC: 26 U/L (ref 4–38)
AST SERPL-CCNC: 29 U/L (ref 14–43)
BASOPHIL %: 0.3 % (ref 0–1.2)
BASOPHIL ABSOLUTE #: 0 10*3/UL (ref 0–0.1)
BILIRUB SERPL-MCNC: 0.4 MG/DL (ref 0–1.3)
BUN SERPL-MCNC: 18 MG/DL (ref 7–20)
CALCIUM SERPL-MCNC: 9.6 MG/DL (ref 8.6–10.6)
CHLORIDE SERPL-SCNC: 102 MMOL/L (ref 96–107)
CHOLEST SERPL-MCNC: 234 MG/DL (ref 140–200)
CO2 SERPL-SCNC: 27 MMOL/L (ref 22–32)
CREAT SERPL-MCNC: 0.7 MG/DL (ref 0.5–1.4)
DIFFERENTIAL TYPE: ABNORMAL
ELLIPTOCYTES: NORMAL
EOSINOPHIL %: 1.9 % (ref 0–10)
EOSINOPHIL ABSOLUTE #: 0.1 10*3/UL (ref 0–0.5)
GFR SERPL CREATININE-BSD FRML MDRD: >60 ML/MIN/{1.73M2}
GFR SERPL CREATININE-BSD FRML MDRD: >60 ML/MIN/{1.73M2}
GLUCOSE P FAST SERPL-MCNC: 116 MG/DL (ref 60–100)
HDLC SERPL-MCNC: 51 MG/DL
HEMATOCRIT: 37.1 % (ref 34–45)
HEMOGLOBIN: 11.5 G/DL (ref 11.2–15.7)
LDLC SERPL DIRECT ASSAY-MCNC: 142 MG/DL (ref 30–100)
LYMPH PERCENT: 32.8 % (ref 20.5–51.1)
LYMPHOCYTE ABSOLUTE #: 2.1 10*3/UL (ref 1.2–3.4)
MEAN CORPUSCULAR HGB CONCENTRATION: 31 % (ref 32–36)
MEAN CORPUSCULAR HGB: 21.6 PG (ref 27–34)
MEAN CORPUSCULAR VOLUME: 69.7 FL (ref 79–95)
MEAN PLATELET VOLUME: 11.5 FL (ref 8.6–12.4)
MICROCYTES: NORMAL
MONOCYTE ABSOLUTE #: 0.4 10*3/UL (ref 0.2–0.9)
MONOCYTE PERCENT: 6.6 % (ref 4.3–12.9)
NEUTROPHIL ABSOLUTE #: 3.8 10*3/UL (ref 1.4–6.5)
NEUTROPHIL PERCENT: 58.4 % (ref 34–73.5)
PLATELET COUNT: 332 10*3/UL (ref 150–400)
POTASSIUM SERPL-SCNC: 4 MMOL/L (ref 3.5–5.3)
PROT SERPL-MCNC: 7.1 G/DL (ref 6.4–8.5)
RED BLOOD CELL COUNT: 5.32 10*6/UL (ref 3.7–5.2)
RED CELL DISTRIBUTION WIDTH: 14.8 % (ref 11.3–14.8)
SODIUM SERPL-SCNC: 137 MMOL/L (ref 136–146)
TRIGL SERPL-MCNC: 351 MG/DL (ref 0–200)
TSH SERPL DL<=0.05 MIU/L-ACNC: 3.02 M[IU]/L (ref 0.3–4.82)
WHITE BLOOD CELL COUNT: 6.5 10*3/UL (ref 4–10)

## 2020-06-20 PROCEDURE — 36415 COLL VENOUS BLD VENIPUNCTURE: CPT | Performed by: FAMILY MEDICINE

## 2020-06-20 PROCEDURE — 80061 LIPID PANEL: CPT | Performed by: FAMILY MEDICINE

## 2020-06-20 PROCEDURE — 84443 ASSAY THYROID STIM HORMONE: CPT | Performed by: FAMILY MEDICINE

## 2020-06-20 PROCEDURE — 82306 VITAMIN D 25 HYDROXY: CPT | Performed by: FAMILY MEDICINE

## 2020-06-20 PROCEDURE — 99213 OFFICE O/P EST LOW 20 MIN: CPT | Performed by: OPHTHALMOLOGY

## 2020-06-20 PROCEDURE — 85025 COMPLETE CBC W/AUTO DIFF WBC: CPT | Performed by: FAMILY MEDICINE

## 2020-06-20 PROCEDURE — 83721 ASSAY OF BLOOD LIPOPROTEIN: CPT | Performed by: FAMILY MEDICINE

## 2020-06-20 PROCEDURE — 80053 COMPREHEN METABOLIC PANEL: CPT | Performed by: FAMILY MEDICINE

## 2020-06-20 SDOH — HEALTH STABILITY: MENTAL HEALTH: HOW OFTEN DO YOU HAVE A DRINK CONTAINING ALCOHOL?: NEVER

## 2020-06-22 ENCOUNTER — E-ADVICE (OUTPATIENT)
Dept: OPHTHALMOLOGY | Age: 60
End: 2020-06-22

## 2020-06-22 ENCOUNTER — E-ADVICE (OUTPATIENT)
Dept: FAMILY MEDICINE | Age: 60
End: 2020-06-22

## 2020-06-22 ENCOUNTER — E-ADVICE (OUTPATIENT)
Dept: GASTROENTEROLOGY | Age: 60
End: 2020-06-22

## 2020-06-24 RX ORDER — PNV NO.95/FERROUS FUM/FOLIC AC 28MG-0.8MG
1000 TABLET ORAL 2 TIMES DAILY
Qty: 60 CAPSULE | Refills: 0 | Status: SHIPPED | COMMUNITY
Start: 2020-06-24 | End: 2022-11-30 | Stop reason: ALTCHOICE

## 2020-06-24 RX ORDER — CHOLECALCIFEROL (VITAMIN D3) 25 MCG
25 CAPSULE ORAL DAILY
Qty: 30 CAPSULE | Refills: 0 | Status: SHIPPED | COMMUNITY
Start: 2020-06-24

## 2020-07-27 ENCOUNTER — OFFICE VISIT (OUTPATIENT)
Dept: PULMONOLOGY | Age: 60
End: 2020-07-27

## 2020-07-27 DIAGNOSIS — J45.50 SEVERE PERSISTENT ASTHMA WITHOUT COMPLICATION: Primary | ICD-10-CM

## 2020-07-27 DIAGNOSIS — J30.9 ALLERGIC RHINITIS, UNSPECIFIED SEASONALITY, UNSPECIFIED TRIGGER: ICD-10-CM

## 2020-07-27 PROCEDURE — 99214 OFFICE O/P EST MOD 30 MIN: CPT | Performed by: INTERNAL MEDICINE

## 2020-07-27 RX ORDER — FLUTICASONE PROPIONATE AND SALMETEROL 250; 50 UG/1; UG/1
1 POWDER RESPIRATORY (INHALATION)
Qty: 3 EACH | Refills: 2 | Status: SHIPPED | OUTPATIENT
Start: 2020-07-27 | End: 2021-03-03 | Stop reason: DRUGHIGH

## 2020-07-27 RX ORDER — MONTELUKAST SODIUM 10 MG/1
10 TABLET ORAL EVERY EVENING
Qty: 90 TABLET | Refills: 2 | Status: SHIPPED | OUTPATIENT
Start: 2020-07-27 | End: 2021-01-19 | Stop reason: SDUPTHER

## 2020-09-02 RX ORDER — ESOMEPRAZOLE MAGNESIUM 40 MG/1
CAPSULE, DELAYED RELEASE ORAL
Qty: 60 CAPSULE | Refills: 5 | Status: SHIPPED | OUTPATIENT
Start: 2020-09-02 | End: 2021-03-03 | Stop reason: ALTCHOICE

## 2020-11-12 ENCOUNTER — E-ADVICE (OUTPATIENT)
Dept: FAMILY MEDICINE | Age: 60
End: 2020-11-12

## 2020-11-12 DIAGNOSIS — H53.9 VISION CHANGES: Primary | ICD-10-CM

## 2020-11-27 ENCOUNTER — APPOINTMENT (OUTPATIENT)
Dept: MAMMOGRAPHY | Age: 60
End: 2020-11-27
Attending: FAMILY MEDICINE

## 2020-12-09 ENCOUNTER — TELEPHONE (OUTPATIENT)
Dept: PULMONOLOGY | Age: 60
End: 2020-12-09

## 2020-12-10 RX ORDER — PANTOPRAZOLE SODIUM 40 MG/1
40 TABLET, DELAYED RELEASE ORAL DAILY
Qty: 30 TABLET | Refills: 3 | Status: SHIPPED | OUTPATIENT
Start: 2020-12-10 | End: 2021-03-04 | Stop reason: DRUGHIGH

## 2020-12-11 RX ORDER — CETIRIZINE HYDROCHLORIDE 10 MG/1
TABLET ORAL
Qty: 90 TABLET | Refills: 0 | Status: SHIPPED | OUTPATIENT
Start: 2020-12-11 | End: 2021-04-02

## 2021-01-06 ENCOUNTER — APPOINTMENT (OUTPATIENT)
Dept: MAMMOGRAPHY | Age: 61
End: 2021-01-06
Attending: FAMILY MEDICINE

## 2021-01-06 ENCOUNTER — APPOINTMENT (OUTPATIENT)
Dept: OPTOMETRY | Age: 61
End: 2021-01-06
Attending: FAMILY MEDICINE

## 2021-01-19 RX ORDER — MONTELUKAST SODIUM 10 MG/1
10 TABLET ORAL EVERY EVENING
Qty: 90 TABLET | Refills: 2 | Status: SHIPPED | OUTPATIENT
Start: 2021-01-19 | End: 2021-09-03 | Stop reason: SDUPTHER

## 2021-01-27 ENCOUNTER — APPOINTMENT (OUTPATIENT)
Dept: PULMONOLOGY | Age: 61
End: 2021-01-27

## 2021-02-09 ENCOUNTER — APPOINTMENT (OUTPATIENT)
Dept: OPTOMETRY | Age: 61
End: 2021-02-09

## 2021-03-03 ENCOUNTER — OFFICE VISIT (OUTPATIENT)
Dept: PULMONOLOGY | Age: 61
End: 2021-03-03

## 2021-03-03 ENCOUNTER — TELEPHONE (OUTPATIENT)
Dept: GASTROENTEROLOGY | Age: 61
End: 2021-03-03

## 2021-03-03 VITALS
RESPIRATION RATE: 18 BRPM | DIASTOLIC BLOOD PRESSURE: 78 MMHG | OXYGEN SATURATION: 97 % | HEART RATE: 102 BPM | SYSTOLIC BLOOD PRESSURE: 120 MMHG | BODY MASS INDEX: 29.06 KG/M2 | WEIGHT: 164 LBS | TEMPERATURE: 97.5 F | HEIGHT: 63 IN

## 2021-03-03 DIAGNOSIS — J30.9 ALLERGIC RHINITIS, UNSPECIFIED SEASONALITY, UNSPECIFIED TRIGGER: ICD-10-CM

## 2021-03-03 DIAGNOSIS — J45.50 SEVERE PERSISTENT ASTHMA WITHOUT COMPLICATION: Primary | ICD-10-CM

## 2021-03-03 DIAGNOSIS — K21.9 GASTROESOPHAGEAL REFLUX DISEASE WITHOUT ESOPHAGITIS: ICD-10-CM

## 2021-03-03 PROCEDURE — 99214 OFFICE O/P EST MOD 30 MIN: CPT | Performed by: INTERNAL MEDICINE

## 2021-03-03 RX ORDER — FLUTICASONE PROPIONATE AND SALMETEROL 250; 50 UG/1; UG/1
2 POWDER RESPIRATORY (INHALATION)
Qty: 1 EACH | Refills: 3 | Status: SHIPPED | OUTPATIENT
Start: 2021-03-03 | End: 2021-07-13

## 2021-03-03 SDOH — HEALTH STABILITY: MENTAL HEALTH: HOW OFTEN DO YOU HAVE A DRINK CONTAINING ALCOHOL?: NEVER

## 2021-03-04 RX ORDER — PANTOPRAZOLE SODIUM 40 MG/1
40 TABLET, DELAYED RELEASE ORAL 2 TIMES DAILY
Qty: 180 TABLET | Refills: 3 | Status: SHIPPED | OUTPATIENT
Start: 2021-03-04 | End: 2022-03-18

## 2021-03-04 RX ORDER — PANTOPRAZOLE SODIUM 40 MG/1
40 TABLET, DELAYED RELEASE ORAL 2 TIMES DAILY
Qty: 180 TABLET | Refills: 3 | Status: CANCELLED | OUTPATIENT
Start: 2021-03-04

## 2021-03-19 ENCOUNTER — OFFICE VISIT (OUTPATIENT)
Dept: GASTROENTEROLOGY | Age: 61
End: 2021-03-19

## 2021-03-19 VITALS — HEIGHT: 64 IN | BODY MASS INDEX: 27.31 KG/M2 | WEIGHT: 160 LBS

## 2021-03-19 DIAGNOSIS — K21.9 GERD WITHOUT ESOPHAGITIS: ICD-10-CM

## 2021-03-19 DIAGNOSIS — K58.9 IRRITABLE BOWEL SYNDROME, UNSPECIFIED TYPE: Primary | ICD-10-CM

## 2021-03-19 PROCEDURE — 99214 OFFICE O/P EST MOD 30 MIN: CPT | Performed by: INTERNAL MEDICINE

## 2021-03-27 ENCOUNTER — IMAGING SERVICES (OUTPATIENT)
Dept: MAMMOGRAPHY | Age: 61
End: 2021-03-27
Attending: FAMILY MEDICINE

## 2021-03-27 ENCOUNTER — IMMUNIZATION (OUTPATIENT)
Dept: LAB | Age: 61
End: 2021-03-27

## 2021-03-27 DIAGNOSIS — Z23 NEED FOR VACCINATION: Primary | ICD-10-CM

## 2021-03-27 DIAGNOSIS — Z12.39 SCREENING FOR BREAST CANCER: ICD-10-CM

## 2021-03-27 PROCEDURE — 77067 SCR MAMMO BI INCL CAD: CPT | Performed by: RADIOLOGY

## 2021-03-27 PROCEDURE — 91300 COVID 19 PFIZER-BIONTECH: CPT

## 2021-03-27 PROCEDURE — 77063 BREAST TOMOSYNTHESIS BI: CPT | Performed by: RADIOLOGY

## 2021-03-27 PROCEDURE — 0001A COVID 19 PFIZER-BIONTECH: CPT

## 2021-03-29 ENCOUNTER — TELEPHONE (OUTPATIENT)
Dept: MAMMOGRAPHY | Age: 61
End: 2021-03-29

## 2021-03-29 ENCOUNTER — E-ADVICE (OUTPATIENT)
Dept: FAMILY MEDICINE | Age: 61
End: 2021-03-29

## 2021-04-02 RX ORDER — CETIRIZINE HYDROCHLORIDE 10 MG/1
TABLET ORAL
Qty: 90 TABLET | Refills: 0 | Status: SHIPPED | OUTPATIENT
Start: 2021-04-02 | End: 2021-05-05

## 2021-04-05 ENCOUNTER — IMAGING SERVICES (OUTPATIENT)
Dept: ULTRASOUND IMAGING | Age: 61
End: 2021-04-05
Attending: FAMILY MEDICINE

## 2021-04-05 ENCOUNTER — IMAGING SERVICES (OUTPATIENT)
Dept: MAMMOGRAPHY | Age: 61
End: 2021-04-05
Attending: FAMILY MEDICINE

## 2021-04-05 DIAGNOSIS — R68.89 ABNORMAL FINDING: Primary | ICD-10-CM

## 2021-04-05 DIAGNOSIS — R68.89 ABNORMAL FINDING: ICD-10-CM

## 2021-04-05 PROCEDURE — 77061 BREAST TOMOSYNTHESIS UNI: CPT | Performed by: RADIOLOGY

## 2021-04-05 PROCEDURE — 76642 ULTRASOUND BREAST LIMITED: CPT | Performed by: RADIOLOGY

## 2021-04-05 PROCEDURE — 77065 DX MAMMO INCL CAD UNI: CPT | Performed by: RADIOLOGY

## 2021-04-17 ENCOUNTER — IMMUNIZATION (OUTPATIENT)
Dept: LAB | Age: 61
End: 2021-04-17
Attending: HOSPITALIST

## 2021-04-17 ENCOUNTER — OFFICE VISIT (OUTPATIENT)
Dept: OPTOMETRY | Age: 61
End: 2021-04-17
Attending: FAMILY MEDICINE

## 2021-04-17 DIAGNOSIS — H52.4 PRESBYOPIA: ICD-10-CM

## 2021-04-17 DIAGNOSIS — Z23 NEED FOR VACCINATION: Primary | ICD-10-CM

## 2021-04-17 DIAGNOSIS — H52.13 MYOPIA OF BOTH EYES: Primary | ICD-10-CM

## 2021-04-17 DIAGNOSIS — H52.223 REGULAR ASTIGMATISM OF BOTH EYES: ICD-10-CM

## 2021-04-17 PROCEDURE — 92015 DETERMINE REFRACTIVE STATE: CPT | Performed by: OPTOMETRIST

## 2021-04-17 PROCEDURE — 91300 COVID 19 PFIZER-BIONTECH: CPT | Performed by: HOSPITALIST

## 2021-04-17 PROCEDURE — 0002A COVID 19 PFIZER-BIONTECH: CPT | Performed by: HOSPITALIST

## 2021-04-17 PROCEDURE — 92004 COMPRE OPH EXAM NEW PT 1/>: CPT | Performed by: OPTOMETRIST

## 2021-04-17 ASSESSMENT — REFRACTION_WEARINGRX
OS_SPHERE: -1.50
OS_CYLINDER: +1.00
OS_SPHERE: +0.50
OD_SPHERE: -1.50
OD_CYLINDER: +1.50
OD_CYLINDER: +1.00
OD_AXIS: 180
OS_AXIS: 180
OS_AXIS: 180
OS_CYLINDER: +0.75
OD_AXIS: 174
OD_SPHERE: +0.25

## 2021-04-17 ASSESSMENT — REFRACTION_MANIFEST
OD_AXIS: 180
OD_CYLINDER: +1.50
OD_ADD: +2.00
OD_SPHERE: -2.00
OS_AXIS: 180
OS_SPHERE: -1.50
OS_ADD: +2.00
OS_CYLINDER: +0.75

## 2021-04-17 ASSESSMENT — VISUAL ACUITY
OD_CC: JAEGER 1
OD_CC: 20/25
METHOD: SNELLEN - LINEAR
OS_CC: 20/30
CORRECTION_TYPE: GLASSES

## 2021-04-17 ASSESSMENT — TONOMETRY
IOP_METHOD: APPLANATION
OS_IOP_MMHG: 12
OD_IOP_MMHG: 12

## 2021-04-17 ASSESSMENT — CONF VISUAL FIELD
OD_NORMAL: 1
OS_NORMAL: 1

## 2021-04-17 ASSESSMENT — EXTERNAL EXAM - LEFT EYE: OS_EXAM: NORMAL

## 2021-04-17 ASSESSMENT — EXTERNAL EXAM - RIGHT EYE: OD_EXAM: NORMAL

## 2021-04-17 ASSESSMENT — SLIT LAMP EXAM - LIDS
COMMENTS: NORMAL
COMMENTS: NORMAL

## 2021-04-17 ASSESSMENT — CUP TO DISC RATIO
OD_RATIO: 0.2
OS_RATIO: 0.2

## 2021-05-05 RX ORDER — CETIRIZINE HYDROCHLORIDE 10 MG/1
TABLET ORAL
Qty: 90 TABLET | Refills: 0 | Status: SHIPPED | OUTPATIENT
Start: 2021-05-05 | End: 2021-08-16

## 2021-05-08 ENCOUNTER — OFFICE VISIT (OUTPATIENT)
Dept: FAMILY MEDICINE | Age: 61
End: 2021-05-08

## 2021-05-08 ENCOUNTER — LAB SERVICES (OUTPATIENT)
Dept: LAB | Age: 61
End: 2021-05-08

## 2021-05-08 VITALS
DIASTOLIC BLOOD PRESSURE: 70 MMHG | OXYGEN SATURATION: 97 % | BODY MASS INDEX: 28.77 KG/M2 | SYSTOLIC BLOOD PRESSURE: 100 MMHG | HEART RATE: 80 BPM | WEIGHT: 165 LBS | TEMPERATURE: 96.1 F

## 2021-05-08 DIAGNOSIS — J45.40 MODERATE PERSISTENT ASTHMA WITHOUT COMPLICATION: ICD-10-CM

## 2021-05-08 DIAGNOSIS — E87.6 HYPOKALEMIA: ICD-10-CM

## 2021-05-08 DIAGNOSIS — K21.00 GASTROESOPHAGEAL REFLUX DISEASE WITH ESOPHAGITIS WITHOUT HEMORRHAGE: ICD-10-CM

## 2021-05-08 DIAGNOSIS — E78.2 MIXED HYPERLIPIDEMIA: ICD-10-CM

## 2021-05-08 DIAGNOSIS — E55.9 VITAMIN D DEFICIENCY: ICD-10-CM

## 2021-05-08 DIAGNOSIS — D50.9 IRON DEFICIENCY ANEMIA, UNSPECIFIED IRON DEFICIENCY ANEMIA TYPE: ICD-10-CM

## 2021-05-08 DIAGNOSIS — J45.40 MODERATE PERSISTENT ASTHMA WITHOUT COMPLICATION: Primary | ICD-10-CM

## 2021-05-08 LAB
25(OH)D3 SERPL-MCNC: 35.8 NG/ML (ref 30–100)
ALBUMIN SERPL-MCNC: 4.5 G/DL (ref 3.6–5.1)
ALP SERPL-CCNC: 112 U/L (ref 45–130)
ALT SERPL W/O P-5'-P-CCNC: 43 U/L (ref 4–38)
AST SERPL-CCNC: 41 U/L (ref 14–43)
BASOPHIL %: 0.7 % (ref 0–1.2)
BASOPHIL ABSOLUTE #: 0.1 10*3/UL (ref 0–0.1)
BILIRUB SERPL-MCNC: 0.4 MG/DL (ref 0–1.3)
BUN SERPL-MCNC: 14 MG/DL (ref 7–20)
CALCIUM SERPL-MCNC: 10 MG/DL (ref 8.6–10.6)
CHLORIDE SERPL-SCNC: 104 MMOL/L (ref 96–107)
CHOLEST SERPL-MCNC: 253 MG/DL (ref 140–200)
CO2 SERPL-SCNC: 27 MMOL/L (ref 22–32)
CREAT SERPL-MCNC: 0.8 MG/DL (ref 0.5–1.4)
DIFFERENTIAL TYPE: ABNORMAL
EOSINOPHIL %: 3.9 % (ref 0–10)
EOSINOPHIL ABSOLUTE #: 0.3 10*3/UL (ref 0–0.5)
GFR SERPL CREATININE-BSD FRML MDRD: >60 ML/MIN/{1.73M2}
GFR SERPL CREATININE-BSD FRML MDRD: >60 ML/MIN/{1.73M2}
GLUCOSE P FAST SERPL-MCNC: 119 MG/DL (ref 60–100)
HDLC SERPL-MCNC: 59 MG/DL
HEMATOCRIT: 38.3 % (ref 34–45)
HEMOGLOBIN: 11.5 G/DL (ref 11.2–15.7)
IMMATURE GRANULOCYTE ABSOLUTE: 0.02 10*3/UL (ref 0–0.05)
IMMATURE GRANULOCYTE PERCENT: 0.3 % (ref 0–0.5)
LDLC SERPL DIRECT ASSAY-MCNC: 161 MG/DL (ref 30–100)
LYMPH PERCENT: 37.9 % (ref 20.5–51.1)
LYMPHOCYTE ABSOLUTE #: 2.8 10*3/UL (ref 1.2–3.4)
MEAN CORPUSCULAR HGB CONCENTRATION: 30 % (ref 32–36)
MEAN CORPUSCULAR HGB: 21.8 PG (ref 27–34)
MEAN CORPUSCULAR VOLUME: 72.5 FL (ref 79–95)
MEAN PLATELET VOLUME: 11.5 FL (ref 8.6–12.4)
MONOCYTE ABSOLUTE #: 0.5 10*3/UL (ref 0.2–0.9)
MONOCYTE PERCENT: 6.3 % (ref 4.3–12.9)
NEUTROPHIL ABSOLUTE #: 3.8 10*3/UL (ref 1.4–6.5)
NEUTROPHIL PERCENT: 50.9 % (ref 34–73.5)
PLATELET COUNT: 327 10*3/UL (ref 150–400)
POTASSIUM SERPL-SCNC: 4.2 MMOL/L (ref 3.5–5.3)
PROT SERPL-MCNC: 7.6 G/DL (ref 6.4–8.5)
RED BLOOD CELL COUNT: 5.28 10*6/UL (ref 3.7–5.2)
RED CELL DISTRIBUTION WIDTH: 14.8 % (ref 11.3–14.8)
SODIUM SERPL-SCNC: 140 MMOL/L (ref 136–146)
T4 FREE SERPL-MCNC: 1.47 NG/DL (ref 0.78–2.19)
TRIGL SERPL-MCNC: 265 MG/DL (ref 0–200)
TSH SERPL DL<=0.05 MIU/L-ACNC: 4.87 M[IU]/L (ref 0.3–4.82)
WHITE BLOOD CELL COUNT: 7.4 10*3/UL (ref 4–10)

## 2021-05-08 PROCEDURE — 36415 COLL VENOUS BLD VENIPUNCTURE: CPT | Performed by: FAMILY MEDICINE

## 2021-05-08 PROCEDURE — 85025 COMPLETE CBC W/AUTO DIFF WBC: CPT | Performed by: FAMILY MEDICINE

## 2021-05-08 PROCEDURE — 84443 ASSAY THYROID STIM HORMONE: CPT | Performed by: FAMILY MEDICINE

## 2021-05-08 PROCEDURE — 99214 OFFICE O/P EST MOD 30 MIN: CPT | Performed by: FAMILY MEDICINE

## 2021-05-08 PROCEDURE — 82306 VITAMIN D 25 HYDROXY: CPT | Performed by: FAMILY MEDICINE

## 2021-05-08 PROCEDURE — 84439 ASSAY OF FREE THYROXINE: CPT | Performed by: FAMILY MEDICINE

## 2021-05-08 PROCEDURE — 80053 COMPREHEN METABOLIC PANEL: CPT | Performed by: FAMILY MEDICINE

## 2021-05-08 PROCEDURE — 80061 LIPID PANEL: CPT | Performed by: FAMILY MEDICINE

## 2021-05-08 PROCEDURE — 83721 ASSAY OF BLOOD LIPOPROTEIN: CPT | Performed by: FAMILY MEDICINE

## 2021-05-08 RX ORDER — SUCRALFATE 1 G/1
1 TABLET ORAL 4 TIMES DAILY
Qty: 120 TABLET | Refills: 1 | Status: SHIPPED | OUTPATIENT
Start: 2021-05-08 | End: 2021-07-14

## 2021-05-08 ASSESSMENT — ENCOUNTER SYMPTOMS
GASTROINTESTINAL NEGATIVE: 1
EYES NEGATIVE: 1
NEUROLOGICAL NEGATIVE: 1
CONSTITUTIONAL NEGATIVE: 1
RESPIRATORY NEGATIVE: 1
ENDOCRINE NEGATIVE: 1
HEMATOLOGIC/LYMPHATIC NEGATIVE: 1
PSYCHIATRIC NEGATIVE: 1
ALLERGIC/IMMUNOLOGIC NEGATIVE: 1

## 2021-05-08 ASSESSMENT — PATIENT HEALTH QUESTIONNAIRE - PHQ9
2. FEELING DOWN, DEPRESSED OR HOPELESS: NOT AT ALL
SUM OF ALL RESPONSES TO PHQ9 QUESTIONS 1 AND 2: 0
CLINICAL INTERPRETATION OF PHQ2 SCORE: NO FURTHER SCREENING NEEDED
1. LITTLE INTEREST OR PLEASURE IN DOING THINGS: NOT AT ALL
CLINICAL INTERPRETATION OF PHQ9 SCORE: NO FURTHER SCREENING NEEDED
SUM OF ALL RESPONSES TO PHQ9 QUESTIONS 1 AND 2: 0

## 2021-05-13 ENCOUNTER — E-ADVICE (OUTPATIENT)
Dept: FAMILY MEDICINE | Age: 61
End: 2021-05-13

## 2021-05-13 DIAGNOSIS — D50.9 IRON DEFICIENCY ANEMIA, UNSPECIFIED IRON DEFICIENCY ANEMIA TYPE: ICD-10-CM

## 2021-05-13 DIAGNOSIS — E87.6 HYPOKALEMIA: ICD-10-CM

## 2021-05-13 DIAGNOSIS — E78.2 MIXED HYPERLIPIDEMIA: Primary | ICD-10-CM

## 2021-07-13 RX ORDER — FLUTICASONE PROPIONATE AND SALMETEROL 50; 250 UG/1; UG/1
POWDER RESPIRATORY (INHALATION)
Qty: 60 EACH | Refills: 3 | Status: SHIPPED | OUTPATIENT
Start: 2021-07-13 | End: 2021-09-03 | Stop reason: SDUPTHER

## 2021-07-14 RX ORDER — SUCRALFATE 1 G/1
TABLET ORAL
Qty: 120 TABLET | Refills: 1 | Status: SHIPPED | OUTPATIENT
Start: 2021-07-14 | End: 2021-10-07

## 2021-07-23 ENCOUNTER — E-ADVICE (OUTPATIENT)
Dept: FAMILY MEDICINE | Age: 61
End: 2021-07-23

## 2021-08-16 RX ORDER — CETIRIZINE HYDROCHLORIDE 10 MG/1
TABLET ORAL
Qty: 90 TABLET | Refills: 3 | Status: SHIPPED | OUTPATIENT
Start: 2021-08-16 | End: 2022-10-11

## 2021-09-03 ENCOUNTER — OFFICE VISIT (OUTPATIENT)
Dept: PULMONOLOGY | Age: 61
End: 2021-09-03

## 2021-09-03 VITALS
WEIGHT: 156 LBS | OXYGEN SATURATION: 97 % | TEMPERATURE: 97 F | HEART RATE: 82 BPM | BODY MASS INDEX: 27.64 KG/M2 | DIASTOLIC BLOOD PRESSURE: 74 MMHG | SYSTOLIC BLOOD PRESSURE: 110 MMHG | RESPIRATION RATE: 16 BRPM | HEIGHT: 63 IN

## 2021-09-03 DIAGNOSIS — K21.9 GASTROESOPHAGEAL REFLUX DISEASE WITHOUT ESOPHAGITIS: ICD-10-CM

## 2021-09-03 DIAGNOSIS — J30.9 ALLERGIC RHINITIS, UNSPECIFIED SEASONALITY, UNSPECIFIED TRIGGER: ICD-10-CM

## 2021-09-03 DIAGNOSIS — J45.50 SEVERE PERSISTENT ASTHMA WITHOUT COMPLICATION: Primary | ICD-10-CM

## 2021-09-03 PROCEDURE — 99214 OFFICE O/P EST MOD 30 MIN: CPT | Performed by: INTERNAL MEDICINE

## 2021-09-03 RX ORDER — MONTELUKAST SODIUM 10 MG/1
10 TABLET ORAL EVERY EVENING
Qty: 90 TABLET | Refills: 2 | Status: SHIPPED | OUTPATIENT
Start: 2021-09-03 | End: 2022-05-04 | Stop reason: SDUPTHER

## 2021-09-03 RX ORDER — FLUTICASONE PROPIONATE AND SALMETEROL 250; 50 UG/1; UG/1
2 POWDER RESPIRATORY (INHALATION) 2 TIMES DAILY
Qty: 60 EACH | Refills: 3 | Status: SHIPPED | OUTPATIENT
Start: 2021-09-03 | End: 2021-12-06

## 2021-09-03 RX ORDER — ALBUTEROL SULFATE 2.5 MG/3ML
2.5 SOLUTION RESPIRATORY (INHALATION) EVERY 6 HOURS PRN
Qty: 75 ML | Refills: 3 | Status: SHIPPED | OUTPATIENT
Start: 2021-09-03 | End: 2023-02-15 | Stop reason: SDUPTHER

## 2021-09-11 ENCOUNTER — LAB SERVICES (OUTPATIENT)
Dept: LAB | Age: 61
End: 2021-09-11

## 2021-09-11 DIAGNOSIS — E78.2 MIXED HYPERLIPIDEMIA: ICD-10-CM

## 2021-09-11 DIAGNOSIS — E87.6 HYPOKALEMIA: ICD-10-CM

## 2021-09-11 DIAGNOSIS — D50.9 IRON DEFICIENCY ANEMIA, UNSPECIFIED IRON DEFICIENCY ANEMIA TYPE: ICD-10-CM

## 2021-09-11 LAB
ALBUMIN SERPL-MCNC: 4.3 G/DL (ref 3.6–5.1)
ALP SERPL-CCNC: 114 U/L (ref 45–130)
ALT SERPL W/O P-5'-P-CCNC: 37 U/L (ref 4–38)
AST SERPL-CCNC: 32 U/L (ref 14–43)
BASOPHIL %: 0.6 % (ref 0–1.2)
BASOPHIL ABSOLUTE #: 0 10*3/UL (ref 0–0.1)
BILIRUB SERPL-MCNC: 0.5 MG/DL (ref 0–1.3)
BUN SERPL-MCNC: 13 MG/DL (ref 7–20)
CALCIUM SERPL-MCNC: 10.1 MG/DL (ref 8.6–10.6)
CHLORIDE SERPL-SCNC: 103 MMOL/L (ref 96–107)
CO2 SERPL-SCNC: 27 MMOL/L (ref 22–32)
CREAT SERPL-MCNC: 0.8 MG/DL (ref 0.5–1.4)
DIFFERENTIAL TYPE: ABNORMAL
EOSINOPHIL %: 2.8 % (ref 0–10)
EOSINOPHIL ABSOLUTE #: 0.2 10*3/UL (ref 0–0.5)
EST. AVERAGE GLUCOSE BLD GHB EST-MCNC: 136 MG/DL (ref 0–154)
FERRITIN SERPL-MCNC: 87 NG/ML (ref 11–264)
GFR SERPL CREATININE-BSD FRML MDRD: >60 ML/MIN/{1.73M2}
GFR SERPL CREATININE-BSD FRML MDRD: >60 ML/MIN/{1.73M2}
GLUCOSE P FAST SERPL-MCNC: 124 MG/DL (ref 60–100)
HBA1C MFR BLD: 6.4 % (ref 4.2–6)
HEMATOCRIT: 38.6 % (ref 34–45)
HEMOGLOBIN: 11.4 G/DL (ref 11.2–15.7)
IMMATURE GRANULOCYTE ABSOLUTE: 0.03 10*3/UL (ref 0–0.05)
IMMATURE GRANULOCYTE PERCENT: 0.4 % (ref 0–0.5)
IRON SATN MFR SERPL: 25 % (ref 9–55)
IRON SERPL-MCNC: 86 UG/DL (ref 37–170)
LYMPH PERCENT: 27.8 % (ref 20.5–51.1)
LYMPHOCYTE ABSOLUTE #: 2 10*3/UL (ref 1.2–3.4)
MEAN CORPUSCULAR HGB CONCENTRATION: 29.5 % (ref 32–36)
MEAN CORPUSCULAR HGB: 21.4 PG (ref 27–34)
MEAN CORPUSCULAR VOLUME: 72.6 FL (ref 79–95)
MEAN PLATELET VOLUME: 11.2 FL (ref 8.6–12.4)
MONOCYTE ABSOLUTE #: 0.5 10*3/UL (ref 0.2–0.9)
MONOCYTE PERCENT: 6.9 % (ref 4.3–12.9)
NEUTROPHIL ABSOLUTE #: 4.4 10*3/UL (ref 1.4–6.5)
NEUTROPHIL PERCENT: 61.5 % (ref 34–73.5)
PLATELET COUNT: 322 10*3/UL (ref 150–400)
POTASSIUM SERPL-SCNC: 4.1 MMOL/L (ref 3.5–5.3)
PROT SERPL-MCNC: 7.4 G/DL (ref 6.4–8.5)
RED BLOOD CELL COUNT: 5.32 10*6/UL (ref 3.7–5.2)
RED CELL DISTRIBUTION WIDTH: 14.9 % (ref 11.3–14.8)
SODIUM SERPL-SCNC: 140 MMOL/L (ref 136–146)
T3FREE SERPL-MCNC: 3.7 PG/ML (ref 2.8–5.3)
TIBC SERPL-MCNC: 342 UG/DL (ref 250–450)
TSH SERPL DL<=0.05 MIU/L-ACNC: 4.32 M[IU]/L (ref 0.3–4.82)
WHITE BLOOD CELL COUNT: 7.1 10*3/UL (ref 4–10)

## 2021-09-11 PROCEDURE — 85025 COMPLETE CBC W/AUTO DIFF WBC: CPT | Performed by: FAMILY MEDICINE

## 2021-09-11 PROCEDURE — 82728 ASSAY OF FERRITIN: CPT | Performed by: FAMILY MEDICINE

## 2021-09-11 PROCEDURE — 83540 ASSAY OF IRON: CPT | Performed by: FAMILY MEDICINE

## 2021-09-11 PROCEDURE — 84443 ASSAY THYROID STIM HORMONE: CPT | Performed by: FAMILY MEDICINE

## 2021-09-11 PROCEDURE — 83036 HEMOGLOBIN GLYCOSYLATED A1C: CPT | Performed by: FAMILY MEDICINE

## 2021-09-11 PROCEDURE — 83550 IRON BINDING TEST: CPT | Performed by: FAMILY MEDICINE

## 2021-09-11 PROCEDURE — 36415 COLL VENOUS BLD VENIPUNCTURE: CPT | Performed by: FAMILY MEDICINE

## 2021-09-11 PROCEDURE — 84481 FREE ASSAY (FT-3): CPT | Performed by: FAMILY MEDICINE

## 2021-09-11 PROCEDURE — 80053 COMPREHEN METABOLIC PANEL: CPT | Performed by: FAMILY MEDICINE

## 2021-09-14 ENCOUNTER — E-ADVICE (OUTPATIENT)
Dept: FAMILY MEDICINE | Age: 61
End: 2021-09-14

## 2021-09-14 DIAGNOSIS — D50.9 IRON DEFICIENCY ANEMIA, UNSPECIFIED IRON DEFICIENCY ANEMIA TYPE: ICD-10-CM

## 2021-09-14 DIAGNOSIS — E87.6 HYPOKALEMIA: Primary | ICD-10-CM

## 2021-09-14 DIAGNOSIS — G11.8: ICD-10-CM

## 2021-09-14 DIAGNOSIS — E78.2 MIXED HYPERLIPIDEMIA: ICD-10-CM

## 2021-09-14 DIAGNOSIS — E11.00 TYPE 2 DIABETES MELLITUS WITH HYPEROSMOLARITY WITHOUT COMA, WITHOUT LONG-TERM CURRENT USE OF INSULIN (CMD): ICD-10-CM

## 2021-09-30 ENCOUNTER — E-ADVICE (OUTPATIENT)
Dept: FAMILY MEDICINE | Age: 61
End: 2021-09-30

## 2021-09-30 DIAGNOSIS — E11.00 TYPE 2 DIABETES MELLITUS WITH HYPEROSMOLARITY WITHOUT COMA, WITHOUT LONG-TERM CURRENT USE OF INSULIN (CMD): Primary | ICD-10-CM

## 2021-10-02 RX ORDER — BLOOD-GLUCOSE METER
1 EACH MISCELLANEOUS
Qty: 1 KIT | Refills: 0 | Status: SHIPPED | OUTPATIENT
Start: 2021-10-02

## 2021-10-02 RX ORDER — LANCETS
EACH MISCELLANEOUS
Qty: 200 EACH | Refills: 3 | Status: SHIPPED | OUTPATIENT
Start: 2021-10-02 | End: 2022-07-26 | Stop reason: SDUPTHER

## 2021-10-07 ENCOUNTER — IMAGING SERVICES (OUTPATIENT)
Dept: MAMMOGRAPHY | Age: 61
End: 2021-10-07
Attending: FAMILY MEDICINE

## 2021-10-07 ENCOUNTER — IMAGING SERVICES (OUTPATIENT)
Dept: ULTRASOUND IMAGING | Age: 61
End: 2021-10-07
Attending: FAMILY MEDICINE

## 2021-10-07 DIAGNOSIS — R68.89 ABNORMAL FINDING: Primary | ICD-10-CM

## 2021-10-07 DIAGNOSIS — R68.89 ABNORMAL FINDING: ICD-10-CM

## 2021-10-07 PROCEDURE — G0279 TOMOSYNTHESIS, MAMMO: HCPCS | Performed by: RADIOLOGY

## 2021-10-07 PROCEDURE — 77065 DX MAMMO INCL CAD UNI: CPT | Performed by: RADIOLOGY

## 2021-10-07 PROCEDURE — 76642 ULTRASOUND BREAST LIMITED: CPT | Performed by: RADIOLOGY

## 2021-10-07 RX ORDER — SUCRALFATE 1 G/1
TABLET ORAL
Qty: 120 TABLET | Refills: 1 | Status: SHIPPED | OUTPATIENT
Start: 2021-10-07 | End: 2021-12-27

## 2021-10-17 ENCOUNTER — IMMUNIZATION (OUTPATIENT)
Dept: FAMILY MEDICINE | Age: 61
End: 2021-10-17

## 2021-10-17 DIAGNOSIS — Z23 NEED FOR VACCINATION: Primary | ICD-10-CM

## 2021-10-17 PROCEDURE — 90686 IIV4 VACC NO PRSV 0.5 ML IM: CPT

## 2021-10-17 PROCEDURE — 90471 IMMUNIZATION ADMIN: CPT

## 2021-12-06 RX ORDER — FLUTICASONE PROPIONATE AND SALMETEROL 50; 250 UG/1; UG/1
POWDER RESPIRATORY (INHALATION)
Qty: 60 EACH | Refills: 3 | Status: SHIPPED | OUTPATIENT
Start: 2021-12-06 | End: 2022-04-27

## 2021-12-13 ENCOUNTER — LAB SERVICES (OUTPATIENT)
Dept: LAB | Age: 61
End: 2021-12-13

## 2021-12-13 DIAGNOSIS — E11.00 TYPE 2 DIABETES MELLITUS WITH HYPEROSMOLARITY WITHOUT COMA, WITHOUT LONG-TERM CURRENT USE OF INSULIN (CMD): ICD-10-CM

## 2021-12-13 DIAGNOSIS — E78.2 MIXED HYPERLIPIDEMIA: ICD-10-CM

## 2021-12-13 DIAGNOSIS — D50.9 IRON DEFICIENCY ANEMIA, UNSPECIFIED IRON DEFICIENCY ANEMIA TYPE: ICD-10-CM

## 2021-12-13 DIAGNOSIS — E87.6 HYPOKALEMIA: ICD-10-CM

## 2021-12-13 LAB
BASOPHIL %: 0.7 % (ref 0–1.2)
BASOPHIL ABSOLUTE #: 0 10*3/UL (ref 0–0.1)
DIFFERENTIAL TYPE: ABNORMAL
EOSINOPHIL %: 3.4 % (ref 0–10)
EOSINOPHIL ABSOLUTE #: 0.2 10*3/UL (ref 0–0.5)
HEMATOCRIT: 38.9 % (ref 34–45)
HEMOGLOBIN: 11.6 G/DL (ref 11.2–15.7)
IMMATURE GRANULOCYTE ABSOLUTE: 0.02 10*3/UL (ref 0–0.05)
IMMATURE GRANULOCYTE PERCENT: 0.3 % (ref 0–0.5)
LYMPH PERCENT: 27.2 % (ref 20.5–51.1)
LYMPHOCYTE ABSOLUTE #: 1.7 10*3/UL (ref 1.2–3.4)
MEAN CORPUSCULAR HGB CONCENTRATION: 29.8 % (ref 32–36)
MEAN CORPUSCULAR HGB: 21.7 PG (ref 27–34)
MEAN CORPUSCULAR VOLUME: 72.8 FL (ref 79–95)
MEAN PLATELET VOLUME: 11.1 FL (ref 8.6–12.4)
MONOCYTE ABSOLUTE #: 0.3 10*3/UL (ref 0.2–0.9)
MONOCYTE PERCENT: 5.4 % (ref 4.3–12.9)
NEUTROPHIL ABSOLUTE #: 3.9 10*3/UL (ref 1.4–6.5)
NEUTROPHIL PERCENT: 63 % (ref 34–73.5)
PLATELET COUNT: 320 10*3/UL (ref 150–400)
RED BLOOD CELL COUNT: 5.34 10*6/UL (ref 3.7–5.2)
RED CELL DISTRIBUTION WIDTH: 15 % (ref 11.3–14.8)
WHITE BLOOD CELL COUNT: 6.1 10*3/UL (ref 4–10)

## 2021-12-13 PROCEDURE — 83540 ASSAY OF IRON: CPT | Performed by: FAMILY MEDICINE

## 2021-12-13 PROCEDURE — 83550 IRON BINDING TEST: CPT | Performed by: FAMILY MEDICINE

## 2021-12-13 PROCEDURE — 82728 ASSAY OF FERRITIN: CPT | Performed by: FAMILY MEDICINE

## 2021-12-13 PROCEDURE — 36415 COLL VENOUS BLD VENIPUNCTURE: CPT | Performed by: FAMILY MEDICINE

## 2021-12-13 PROCEDURE — 83036 HEMOGLOBIN GLYCOSYLATED A1C: CPT | Performed by: FAMILY MEDICINE

## 2021-12-13 PROCEDURE — 85025 COMPLETE CBC W/AUTO DIFF WBC: CPT | Performed by: FAMILY MEDICINE

## 2021-12-14 ENCOUNTER — LAB SERVICES (OUTPATIENT)
Dept: LAB | Age: 61
End: 2021-12-14

## 2021-12-14 DIAGNOSIS — E78.2 MIXED HYPERLIPIDEMIA: ICD-10-CM

## 2021-12-14 DIAGNOSIS — D50.9 IRON DEFICIENCY ANEMIA, UNSPECIFIED IRON DEFICIENCY ANEMIA TYPE: ICD-10-CM

## 2021-12-14 DIAGNOSIS — E87.6 HYPOKALEMIA: ICD-10-CM

## 2021-12-14 DIAGNOSIS — E11.00 TYPE 2 DIABETES MELLITUS WITH HYPEROSMOLARITY WITHOUT COMA, WITHOUT LONG-TERM CURRENT USE OF INSULIN (CMD): ICD-10-CM

## 2021-12-14 LAB
ALBUMIN SERPL-MCNC: 4.4 G/DL (ref 3.6–5.1)
ALP SERPL-CCNC: 107 U/L (ref 45–130)
ALT SERPL W/O P-5'-P-CCNC: 23 U/L (ref 4–38)
AST SERPL-CCNC: 28 U/L (ref 14–43)
BILIRUB SERPL-MCNC: 0.6 MG/DL (ref 0–1.3)
BUN SERPL-MCNC: 12 MG/DL (ref 7–20)
CALCIUM SERPL-MCNC: 9.9 MG/DL (ref 8.6–10.6)
CHLORIDE SERPL-SCNC: 104 MMOL/L (ref 96–107)
CO2 SERPL-SCNC: 26 MMOL/L (ref 22–32)
CREAT SERPL-MCNC: 0.8 MG/DL (ref 0.5–1.4)
EST. AVERAGE GLUCOSE BLD GHB EST-MCNC: 130 MG/DL (ref 0–154)
FERRITIN SERPL-MCNC: 95 NG/ML (ref 11–264)
GFR SERPL CREATININE-BSD FRML MDRD: >60 ML/MIN/{1.73M2}
GFR SERPL CREATININE-BSD FRML MDRD: >60 ML/MIN/{1.73M2}
GLUCOSE P FAST SERPL-MCNC: 122 MG/DL (ref 60–100)
HBA1C MFR BLD: 6.1 % (ref 4.2–6)
IRON SATN MFR SERPL: 27 % (ref 9–55)
IRON SERPL-MCNC: 87 UG/DL (ref 37–170)
POTASSIUM SERPL-SCNC: 3.8 MMOL/L (ref 3.5–5.3)
PROT SERPL-MCNC: 7.3 G/DL (ref 6.4–8.5)
SODIUM SERPL-SCNC: 139 MMOL/L (ref 136–146)
TIBC SERPL-MCNC: 321 UG/DL (ref 250–450)

## 2021-12-14 PROCEDURE — 36415 COLL VENOUS BLD VENIPUNCTURE: CPT | Performed by: FAMILY MEDICINE

## 2021-12-14 PROCEDURE — 80053 COMPREHEN METABOLIC PANEL: CPT | Performed by: FAMILY MEDICINE

## 2021-12-15 ENCOUNTER — OFFICE VISIT (OUTPATIENT)
Dept: FAMILY MEDICINE | Age: 61
End: 2021-12-15

## 2021-12-15 VITALS
HEIGHT: 63 IN | BODY MASS INDEX: 28.28 KG/M2 | DIASTOLIC BLOOD PRESSURE: 72 MMHG | TEMPERATURE: 97.4 F | SYSTOLIC BLOOD PRESSURE: 110 MMHG | HEART RATE: 80 BPM | WEIGHT: 159.6 LBS | RESPIRATION RATE: 12 BRPM

## 2021-12-15 DIAGNOSIS — H91.90 HEARING LOSS, UNSPECIFIED HEARING LOSS TYPE, UNSPECIFIED LATERALITY: ICD-10-CM

## 2021-12-15 DIAGNOSIS — R25.2 LEG CRAMPS: ICD-10-CM

## 2021-12-15 DIAGNOSIS — R73.01 IMPAIRED FASTING GLUCOSE: ICD-10-CM

## 2021-12-15 DIAGNOSIS — E78.5 DYSLIPIDEMIA: ICD-10-CM

## 2021-12-15 DIAGNOSIS — K21.00 GASTROESOPHAGEAL REFLUX DISEASE WITH ESOPHAGITIS WITHOUT HEMORRHAGE: ICD-10-CM

## 2021-12-15 DIAGNOSIS — R41.3 IMPAIRED MEMORY: ICD-10-CM

## 2021-12-15 DIAGNOSIS — E11.9 DIABETES MELLITUS TYPE 2, DIET-CONTROLLED (CMD): Primary | ICD-10-CM

## 2021-12-15 PROCEDURE — 3078F DIAST BP <80 MM HG: CPT | Performed by: PHYSICIAN ASSISTANT

## 2021-12-15 PROCEDURE — 3074F SYST BP LT 130 MM HG: CPT | Performed by: PHYSICIAN ASSISTANT

## 2021-12-15 PROCEDURE — 99215 OFFICE O/P EST HI 40 MIN: CPT | Performed by: PHYSICIAN ASSISTANT

## 2021-12-16 ENCOUNTER — LAB SERVICES (OUTPATIENT)
Dept: LAB | Age: 61
End: 2021-12-16

## 2021-12-16 DIAGNOSIS — R25.2 LEG CRAMPS: ICD-10-CM

## 2021-12-16 PROCEDURE — 36415 COLL VENOUS BLD VENIPUNCTURE: CPT | Performed by: PHYSICIAN ASSISTANT

## 2021-12-16 PROCEDURE — 83735 ASSAY OF MAGNESIUM: CPT | Performed by: PHYSICIAN ASSISTANT

## 2021-12-17 LAB — MAGNESIUM SERPL-MCNC: 1.9 MG/DL (ref 1.6–2.6)

## 2021-12-20 ENCOUNTER — TELEPHONE (OUTPATIENT)
Dept: PSYCHOLOGY | Age: 61
End: 2021-12-20

## 2021-12-27 ENCOUNTER — E-ADVICE (OUTPATIENT)
Dept: PULMONOLOGY | Age: 61
End: 2021-12-27

## 2021-12-27 RX ORDER — SUCRALFATE 1 G/1
TABLET ORAL
Qty: 120 TABLET | Refills: 1 | Status: SHIPPED | OUTPATIENT
Start: 2021-12-27 | End: 2022-03-18

## 2022-01-17 ENCOUNTER — TELEPHONE (OUTPATIENT)
Dept: PSYCHOLOGY | Age: 62
End: 2022-01-17

## 2022-01-25 ENCOUNTER — APPOINTMENT (OUTPATIENT)
Dept: AUDIOLOGY | Age: 62
End: 2022-01-25

## 2022-02-04 ENCOUNTER — TELEPHONE (OUTPATIENT)
Dept: PULMONOLOGY | Age: 62
End: 2022-02-04

## 2022-02-06 ENCOUNTER — E-ADVICE (OUTPATIENT)
Dept: OPTOMETRY | Age: 62
End: 2022-02-06

## 2022-02-07 ENCOUNTER — TELEPHONE (OUTPATIENT)
Dept: AUDIOLOGY | Age: 62
End: 2022-02-07

## 2022-02-15 ENCOUNTER — OFFICE VISIT (OUTPATIENT)
Dept: AUDIOLOGY | Age: 62
End: 2022-02-15

## 2022-02-15 DIAGNOSIS — H90.3 SENSORINEURAL HEARING LOSS (SNHL) OF BOTH EARS: Primary | ICD-10-CM

## 2022-02-15 PROCEDURE — 92557 COMPREHENSIVE HEARING TEST: CPT | Performed by: AUDIOLOGIST

## 2022-02-15 PROCEDURE — 92567 TYMPANOMETRY: CPT | Performed by: AUDIOLOGIST

## 2022-03-04 ENCOUNTER — APPOINTMENT (OUTPATIENT)
Dept: PULMONOLOGY | Age: 62
End: 2022-03-04

## 2022-03-18 RX ORDER — PANTOPRAZOLE SODIUM 40 MG/1
TABLET, DELAYED RELEASE ORAL
Qty: 180 TABLET | Refills: 0 | Status: SHIPPED | OUTPATIENT
Start: 2022-03-18 | End: 2022-06-29

## 2022-03-18 RX ORDER — SUCRALFATE 1 G/1
TABLET ORAL
Qty: 120 TABLET | Refills: 1 | Status: SHIPPED | OUTPATIENT
Start: 2022-03-18 | End: 2022-06-23

## 2022-04-27 RX ORDER — FLUTICASONE PROPIONATE AND SALMETEROL 50; 250 UG/1; UG/1
POWDER RESPIRATORY (INHALATION)
Qty: 60 EACH | Refills: 3 | Status: SHIPPED | OUTPATIENT
Start: 2022-04-27 | End: 2022-05-04 | Stop reason: ALTCHOICE

## 2022-04-29 ENCOUNTER — IMMUNIZATION (OUTPATIENT)
Dept: LAB | Age: 62
End: 2022-04-29

## 2022-04-29 DIAGNOSIS — Z23 NEED FOR VACCINATION: Primary | ICD-10-CM

## 2022-04-29 PROCEDURE — 0054A IMMUNIZATION ADMIN IM SARS COV2 30MCG 0.3ML TRIS-SUCROSE FORM BOOSTER: CPT | Performed by: INTERNAL MEDICINE

## 2022-04-29 PROCEDURE — 91305 COVID 19 12Y AND OLDER PFIZER-BIONTECH - DO NOT DILUTE: CPT | Performed by: INTERNAL MEDICINE

## 2022-05-04 ENCOUNTER — OFFICE VISIT (OUTPATIENT)
Dept: PULMONOLOGY | Age: 62
End: 2022-05-04

## 2022-05-04 VITALS
SYSTOLIC BLOOD PRESSURE: 118 MMHG | RESPIRATION RATE: 18 BRPM | HEIGHT: 63 IN | BODY MASS INDEX: 28.96 KG/M2 | WEIGHT: 163.47 LBS | OXYGEN SATURATION: 97 % | TEMPERATURE: 97.8 F | HEART RATE: 86 BPM | DIASTOLIC BLOOD PRESSURE: 80 MMHG

## 2022-05-04 DIAGNOSIS — J45.50 SEVERE PERSISTENT ASTHMA WITHOUT COMPLICATION: Primary | ICD-10-CM

## 2022-05-04 DIAGNOSIS — J32.9 RHINOSINUSITIS: ICD-10-CM

## 2022-05-04 DIAGNOSIS — J30.9 ALLERGIC RHINITIS, UNSPECIFIED SEASONALITY, UNSPECIFIED TRIGGER: ICD-10-CM

## 2022-05-04 PROCEDURE — 99214 OFFICE O/P EST MOD 30 MIN: CPT | Performed by: INTERNAL MEDICINE

## 2022-05-04 RX ORDER — ALBUTEROL SULFATE 90 UG/1
2 AEROSOL, METERED RESPIRATORY (INHALATION) EVERY 6 HOURS PRN
Qty: 1 EACH | Refills: 5 | Status: SHIPPED | OUTPATIENT
Start: 2022-05-04 | End: 2023-05-26 | Stop reason: SDUPTHER

## 2022-05-04 RX ORDER — MONTELUKAST SODIUM 10 MG/1
10 TABLET ORAL EVERY EVENING
Qty: 90 TABLET | Refills: 1 | Status: SHIPPED | OUTPATIENT
Start: 2022-05-04 | End: 2022-11-04 | Stop reason: SDUPTHER

## 2022-05-04 RX ORDER — FLUTICASONE FUROATE AND VILANTEROL 200; 25 UG/1; UG/1
1 POWDER RESPIRATORY (INHALATION) DAILY
Qty: 1 EACH | Refills: 5 | Status: SHIPPED | OUTPATIENT
Start: 2022-05-04 | End: 2022-11-04 | Stop reason: SDUPTHER

## 2022-05-05 ENCOUNTER — TELEPHONE (OUTPATIENT)
Dept: FAMILY MEDICINE | Age: 62
End: 2022-05-05

## 2022-05-05 DIAGNOSIS — E11.9 DIABETES MELLITUS TYPE 2, DIET-CONTROLLED (CMD): Primary | ICD-10-CM

## 2022-05-05 RX ORDER — LANCETS 30 GAUGE
EACH MISCELLANEOUS
Qty: 100 EACH | Refills: 3 | Status: SHIPPED | OUTPATIENT
Start: 2022-05-05 | End: 2022-11-30 | Stop reason: SDUPTHER

## 2022-05-05 RX ORDER — BLOOD-GLUCOSE METER
1 EACH MISCELLANEOUS
Qty: 1 KIT | Refills: 1 | Status: SHIPPED | OUTPATIENT
Start: 2022-05-05

## 2022-05-27 ENCOUNTER — OFFICE VISIT (OUTPATIENT)
Dept: OTOLARYNGOLOGY | Age: 62
End: 2022-05-27
Attending: INTERNAL MEDICINE

## 2022-05-27 VITALS — WEIGHT: 163 LBS | BODY MASS INDEX: 28.88 KG/M2 | HEIGHT: 63 IN

## 2022-05-27 DIAGNOSIS — R49.0 DYSPHONIA: ICD-10-CM

## 2022-05-27 DIAGNOSIS — J30.9 ALLERGIC RHINITIS, UNSPECIFIED SEASONALITY, UNSPECIFIED TRIGGER: Primary | ICD-10-CM

## 2022-05-27 PROBLEM — J45.40 MODERATE PERSISTENT ASTHMA WITHOUT COMPLICATION: Chronic | Status: ACTIVE | Noted: 2017-12-29

## 2022-05-27 PROCEDURE — 31575 DIAGNOSTIC LARYNGOSCOPY: CPT | Performed by: OTOLARYNGOLOGY

## 2022-05-27 PROCEDURE — 99244 OFF/OP CNSLTJ NEW/EST MOD 40: CPT | Performed by: OTOLARYNGOLOGY

## 2022-05-27 RX ORDER — AZELASTINE 1 MG/ML
2 SPRAY, METERED NASAL 2 TIMES DAILY
Qty: 30 ML | Refills: 12 | Status: SHIPPED | OUTPATIENT
Start: 2022-05-27

## 2022-06-11 ENCOUNTER — LAB SERVICES (OUTPATIENT)
Dept: LAB | Age: 62
End: 2022-06-11

## 2022-06-11 DIAGNOSIS — E78.5 DYSLIPIDEMIA: ICD-10-CM

## 2022-06-11 DIAGNOSIS — R73.01 IMPAIRED FASTING GLUCOSE: ICD-10-CM

## 2022-06-11 DIAGNOSIS — E11.9 DIABETES MELLITUS TYPE 2, DIET-CONTROLLED (CMD): ICD-10-CM

## 2022-06-11 LAB
ALBUMIN SERPL-MCNC: 4.4 G/DL (ref 3.6–5.1)
ALBUMIN/CREAT UR: NORMAL MG/G
ALP SERPL-CCNC: 112 U/L (ref 45–130)
ALT SERPL W/O P-5'-P-CCNC: 36 U/L (ref 4–38)
AST SERPL-CCNC: 36 U/L (ref 14–43)
BILIRUB SERPL-MCNC: 0.8 MG/DL (ref 0–1.3)
BUN SERPL-MCNC: 11 MG/DL (ref 7–20)
CALCIUM SERPL-MCNC: 9.4 MG/DL (ref 8.6–10.6)
CHLORIDE SERPL-SCNC: 103 MMOL/L (ref 96–107)
CHOLEST SERPL-MCNC: 222 MG/DL (ref 140–200)
CO2 SERPL-SCNC: 27 MMOL/L (ref 22–32)
CREAT SERPL-MCNC: 0.8 MG/DL (ref 0.5–1.4)
CREAT UR-MCNC: 122.6 MG/DL (ref 30–125)
EST. AVERAGE GLUCOSE BLD GHB EST-MCNC: 147 MG/DL (ref 0–154)
GFR SERPL CREATININE-BSD FRML MDRD: >60 ML/MIN/{1.73M2}
GFR SERPL CREATININE-BSD FRML MDRD: >60 ML/MIN/{1.73M2}
GLUCOSE P FAST SERPL-MCNC: 113 MG/DL (ref 60–100)
HBA1C MFR BLD: 6.7 % (ref 4.2–6)
HDLC SERPL-MCNC: 60 MG/DL
LDLC SERPL CALC-MCNC: 120 MG/DL (ref 30–100)
MICROALBUMIN UR-MCNC: <6 MG/L
POTASSIUM SERPL-SCNC: 4 MMOL/L (ref 3.5–5.3)
PROT SERPL-MCNC: 7.4 G/DL (ref 6.4–8.5)
SODIUM SERPL-SCNC: 141 MMOL/L (ref 136–146)
TRIGL SERPL-MCNC: 209 MG/DL (ref 0–200)

## 2022-06-11 PROCEDURE — 82570 ASSAY OF URINE CREATININE: CPT | Performed by: PHYSICIAN ASSISTANT

## 2022-06-11 PROCEDURE — 80061 LIPID PANEL: CPT | Performed by: PHYSICIAN ASSISTANT

## 2022-06-11 PROCEDURE — 36415 COLL VENOUS BLD VENIPUNCTURE: CPT | Performed by: PHYSICIAN ASSISTANT

## 2022-06-11 PROCEDURE — 80053 COMPREHEN METABOLIC PANEL: CPT | Performed by: PHYSICIAN ASSISTANT

## 2022-06-11 PROCEDURE — 82043 UR ALBUMIN QUANTITATIVE: CPT | Performed by: PHYSICIAN ASSISTANT

## 2022-06-11 PROCEDURE — 83036 HEMOGLOBIN GLYCOSYLATED A1C: CPT | Performed by: PHYSICIAN ASSISTANT

## 2022-06-23 RX ORDER — SUCRALFATE 1 G/1
TABLET ORAL
Qty: 120 TABLET | Refills: 1 | Status: SHIPPED | OUTPATIENT
Start: 2022-06-23 | End: 2022-08-18

## 2022-06-29 RX ORDER — PANTOPRAZOLE SODIUM 40 MG/1
TABLET, DELAYED RELEASE ORAL
Qty: 180 TABLET | Refills: 0 | Status: SHIPPED | OUTPATIENT
Start: 2022-06-29 | End: 2022-11-15 | Stop reason: ALTCHOICE

## 2022-07-13 RX ORDER — MONTELUKAST SODIUM 10 MG/1
10 TABLET ORAL EVERY EVENING
Qty: 90 TABLET | Refills: 1 | OUTPATIENT
Start: 2022-07-13

## 2022-07-16 ENCOUNTER — OFFICE VISIT (OUTPATIENT)
Dept: FAMILY MEDICINE | Age: 62
End: 2022-07-16

## 2022-07-16 VITALS
SYSTOLIC BLOOD PRESSURE: 116 MMHG | HEIGHT: 63 IN | WEIGHT: 161.8 LBS | TEMPERATURE: 97.8 F | BODY MASS INDEX: 28.67 KG/M2 | OXYGEN SATURATION: 98 % | RESPIRATION RATE: 12 BRPM | HEART RATE: 90 BPM | DIASTOLIC BLOOD PRESSURE: 76 MMHG

## 2022-07-16 DIAGNOSIS — K21.9 GASTROESOPHAGEAL REFLUX DISEASE, UNSPECIFIED WHETHER ESOPHAGITIS PRESENT: ICD-10-CM

## 2022-07-16 DIAGNOSIS — E11.69 TYPE 2 DIABETES MELLITUS WITH HYPERLIPIDEMIA (CMD): ICD-10-CM

## 2022-07-16 DIAGNOSIS — E78.5 TYPE 2 DIABETES MELLITUS WITH HYPERLIPIDEMIA (CMD): ICD-10-CM

## 2022-07-16 DIAGNOSIS — E11.9 TYPE 2 DIABETES MELLITUS WITHOUT COMPLICATION, WITHOUT LONG-TERM CURRENT USE OF INSULIN (CMD): Primary | ICD-10-CM

## 2022-07-16 DIAGNOSIS — Z23 NEED FOR SHINGLES VACCINE: ICD-10-CM

## 2022-07-16 PROCEDURE — 3078F DIAST BP <80 MM HG: CPT | Performed by: PHYSICIAN ASSISTANT

## 2022-07-16 PROCEDURE — 3074F SYST BP LT 130 MM HG: CPT | Performed by: PHYSICIAN ASSISTANT

## 2022-07-16 PROCEDURE — 90750 HZV VACC RECOMBINANT IM: CPT

## 2022-07-16 PROCEDURE — 99214 OFFICE O/P EST MOD 30 MIN: CPT | Performed by: PHYSICIAN ASSISTANT

## 2022-07-16 PROCEDURE — 90471 IMMUNIZATION ADMIN: CPT

## 2022-07-16 RX ORDER — ATORVASTATIN CALCIUM 10 MG/1
10 TABLET, FILM COATED ORAL DAILY
Qty: 90 TABLET | Refills: 1 | Status: SHIPPED | OUTPATIENT
Start: 2022-07-16 | End: 2022-11-30 | Stop reason: SDUPTHER

## 2022-07-16 ASSESSMENT — ASTHMA QUESTIONNAIRES
ACT_TOTALSCORE: 16
QUESTION_4 LAST FOUR WEEKS HOW OFTEN HAVE YOU USED YOUR RESCUE INHALER OR NEBULIZER MEDICATION (SUCH AS ALBUTEROL): 2 OR 3 TIMES/WEEK
QUESTION_3 LAST FOUR WEEKS HOW OFTEN DID YOUR ASTHMA SYMPTOMS (WHEEZING, COUGHING, SHORTNESS OF BREATH, CHEST TIGHTNESS OR PAIN) WAKE YOU UP AT NIGHT OR EARLIER THAN USUAL IN THE MORNING: ONCE A WEEK
QUESTION_1 LAST FOUR WEEKS HOW MUCH OF THE TIME DID YOUR ASTHMA KEEP YOU FROM GETTING AS MUCH DONE AT WORK, SCHOOL OR AT HOME: SOME OF THE TIME
QUESTION_5 LAST FOUR WEEKS HOW WOULD YOU RATE YOUR ASTHMA CONTROL: SOMEWHAT CONTROLLED
QUESTION_2 LAST FOUR WEEKS HOW OFTEN HAVE YOU HAD SHORTNESS OF BREATH: ONCE OR TWICE A WEEK

## 2022-07-18 ENCOUNTER — TELEPHONE (OUTPATIENT)
Dept: PSYCHOLOGY | Age: 62
End: 2022-07-18

## 2022-07-18 ENCOUNTER — TELEPHONE (OUTPATIENT)
Dept: BEHAVIORAL HEALTH | Age: 62
End: 2022-07-18

## 2022-07-26 ENCOUNTER — E-ADVICE (OUTPATIENT)
Dept: FAMILY MEDICINE | Age: 62
End: 2022-07-26

## 2022-07-26 RX ORDER — LANCETS
EACH MISCELLANEOUS
Qty: 200 EACH | Refills: 3 | Status: SHIPPED | OUTPATIENT
Start: 2022-07-26

## 2022-08-18 RX ORDER — SUCRALFATE 1 G/1
TABLET ORAL
Qty: 120 TABLET | Refills: 1 | Status: SHIPPED | OUTPATIENT
Start: 2022-08-18 | End: 2022-11-15 | Stop reason: SDUPTHER

## 2022-10-08 ENCOUNTER — LAB SERVICES (OUTPATIENT)
Dept: LAB | Age: 62
End: 2022-10-08

## 2022-10-08 DIAGNOSIS — E11.69 TYPE 2 DIABETES MELLITUS WITH HYPERLIPIDEMIA (CMD): ICD-10-CM

## 2022-10-08 DIAGNOSIS — E11.9 TYPE 2 DIABETES MELLITUS WITHOUT COMPLICATION, WITHOUT LONG-TERM CURRENT USE OF INSULIN (CMD): ICD-10-CM

## 2022-10-08 DIAGNOSIS — E78.5 TYPE 2 DIABETES MELLITUS WITH HYPERLIPIDEMIA (CMD): ICD-10-CM

## 2022-10-08 PROCEDURE — 80053 COMPREHEN METABOLIC PANEL: CPT | Performed by: PHYSICIAN ASSISTANT

## 2022-10-08 PROCEDURE — 36415 COLL VENOUS BLD VENIPUNCTURE: CPT | Performed by: PHYSICIAN ASSISTANT

## 2022-10-08 PROCEDURE — 83036 HEMOGLOBIN GLYCOSYLATED A1C: CPT | Performed by: PHYSICIAN ASSISTANT

## 2022-10-08 PROCEDURE — 80061 LIPID PANEL: CPT | Performed by: PHYSICIAN ASSISTANT

## 2022-10-10 LAB
ALBUMIN SERPL-MCNC: 4.2 G/DL (ref 3.6–5.1)
ALP SERPL-CCNC: 110 U/L (ref 45–130)
ALT SERPL W/O P-5'-P-CCNC: 32 U/L (ref 4–38)
AST SERPL-CCNC: 27 U/L (ref 14–43)
BILIRUB SERPL-MCNC: 1 MG/DL (ref 0–1.3)
BUN SERPL-MCNC: 13 MG/DL (ref 7–20)
CALCIUM SERPL-MCNC: 9.6 MG/DL (ref 8.6–10.6)
CHLORIDE SERPL-SCNC: 106 MMOL/L (ref 96–107)
CHOLEST SERPL-MCNC: 154 MG/DL (ref 140–200)
CO2 SERPL-SCNC: 27 MMOL/L (ref 22–32)
CREAT SERPL-MCNC: 0.8 MG/DL (ref 0.5–1.4)
EST. AVERAGE GLUCOSE BLD GHB EST-MCNC: 135 MG/DL (ref 0–154)
GFR SERPL CREATININE-BSD FRML MDRD: >60 ML/MIN/{1.73M2}
GFR SERPL CREATININE-BSD FRML MDRD: >60 ML/MIN/{1.73M2}
GLUCOSE P FAST SERPL-MCNC: 109 MG/DL (ref 60–100)
HBA1C MFR BLD: 6.3 % (ref 4.2–6)
HDLC SERPL-MCNC: 72 MG/DL
LDLC SERPL CALC-MCNC: 52 MG/DL (ref 30–100)
POTASSIUM SERPL-SCNC: 4.2 MMOL/L (ref 3.5–5.3)
PROT SERPL-MCNC: 7.4 G/DL (ref 6.4–8.5)
SODIUM SERPL-SCNC: 140 MMOL/L (ref 136–146)
TRIGL SERPL-MCNC: 149 MG/DL (ref 0–200)

## 2022-10-11 RX ORDER — CETIRIZINE HYDROCHLORIDE 10 MG/1
TABLET ORAL
Qty: 90 TABLET | Refills: 2 | Status: SHIPPED | OUTPATIENT
Start: 2022-10-11

## 2022-10-14 ENCOUNTER — IMMUNIZATION (OUTPATIENT)
Dept: FAMILY MEDICINE | Age: 62
End: 2022-10-14

## 2022-10-14 DIAGNOSIS — Z23 NEED FOR VACCINATION: Primary | ICD-10-CM

## 2022-10-14 PROCEDURE — 90686 IIV4 VACC NO PRSV 0.5 ML IM: CPT | Performed by: FAMILY MEDICINE

## 2022-10-14 PROCEDURE — 90471 IMMUNIZATION ADMIN: CPT | Performed by: FAMILY MEDICINE

## 2022-10-27 ENCOUNTER — APPOINTMENT (OUTPATIENT)
Dept: GASTROENTEROLOGY | Age: 62
End: 2022-10-27
Attending: PHYSICIAN ASSISTANT

## 2022-11-02 ENCOUNTER — TELEPHONE (OUTPATIENT)
Dept: GASTROENTEROLOGY | Age: 62
End: 2022-11-02

## 2022-11-02 ENCOUNTER — E-ADVICE (OUTPATIENT)
Dept: FAMILY MEDICINE | Age: 62
End: 2022-11-02

## 2022-11-02 RX ORDER — PANTOPRAZOLE SODIUM 40 MG/1
40 TABLET, DELAYED RELEASE ORAL 2 TIMES DAILY
Qty: 180 TABLET | Refills: 1 | Status: SHIPPED | OUTPATIENT
Start: 2022-11-02 | End: 2022-11-15 | Stop reason: SDUPTHER

## 2022-11-04 ENCOUNTER — OFFICE VISIT (OUTPATIENT)
Dept: PULMONOLOGY | Age: 62
End: 2022-11-04

## 2022-11-04 VITALS
DIASTOLIC BLOOD PRESSURE: 76 MMHG | BODY MASS INDEX: 28.53 KG/M2 | SYSTOLIC BLOOD PRESSURE: 108 MMHG | HEIGHT: 63 IN | HEART RATE: 82 BPM | OXYGEN SATURATION: 97 % | WEIGHT: 161 LBS | RESPIRATION RATE: 14 BRPM | TEMPERATURE: 97.1 F

## 2022-11-04 DIAGNOSIS — J32.9 RHINOSINUSITIS: ICD-10-CM

## 2022-11-04 DIAGNOSIS — J45.50 SEVERE PERSISTENT ASTHMA WITHOUT COMPLICATION: Primary | ICD-10-CM

## 2022-11-04 DIAGNOSIS — K21.9 GASTROESOPHAGEAL REFLUX DISEASE WITHOUT ESOPHAGITIS: ICD-10-CM

## 2022-11-04 PROCEDURE — 99214 OFFICE O/P EST MOD 30 MIN: CPT | Performed by: INTERNAL MEDICINE

## 2022-11-04 RX ORDER — MONTELUKAST SODIUM 10 MG/1
10 TABLET ORAL EVERY EVENING
Qty: 90 TABLET | Refills: 1 | Status: SHIPPED | OUTPATIENT
Start: 2022-11-04

## 2022-11-04 RX ORDER — FLUTICASONE FUROATE AND VILANTEROL 200; 25 UG/1; UG/1
1 POWDER RESPIRATORY (INHALATION) DAILY
Qty: 1 EACH | Refills: 5 | Status: SHIPPED | OUTPATIENT
Start: 2022-11-04 | End: 2022-12-06

## 2022-11-07 ENCOUNTER — TELEPHONE (OUTPATIENT)
Dept: GASTROENTEROLOGY | Age: 62
End: 2022-11-07

## 2022-11-15 ENCOUNTER — OFFICE VISIT (OUTPATIENT)
Dept: GASTROENTEROLOGY | Age: 62
End: 2022-11-15

## 2022-11-15 ENCOUNTER — TELEPHONE (OUTPATIENT)
Dept: GASTROENTEROLOGY | Age: 62
End: 2022-11-15

## 2022-11-15 VITALS — WEIGHT: 159.83 LBS | BODY MASS INDEX: 28.32 KG/M2 | HEIGHT: 63 IN

## 2022-11-15 DIAGNOSIS — K59.00 CONSTIPATION, UNSPECIFIED CONSTIPATION TYPE: ICD-10-CM

## 2022-11-15 DIAGNOSIS — R05.3 CHRONIC COUGH: Primary | ICD-10-CM

## 2022-11-15 DIAGNOSIS — Z86.010 PERSONAL HISTORY OF COLONIC POLYPS: ICD-10-CM

## 2022-11-15 DIAGNOSIS — K64.9 HEMORRHOIDS, UNSPECIFIED HEMORRHOID TYPE: ICD-10-CM

## 2022-11-15 DIAGNOSIS — R10.13 EPIGASTRIC PAIN: ICD-10-CM

## 2022-11-15 DIAGNOSIS — Z86.010 HX OF COLONIC POLYPS: ICD-10-CM

## 2022-11-15 DIAGNOSIS — Z01.818 PRE-OP TESTING: ICD-10-CM

## 2022-11-15 PROBLEM — Z86.0100 PERSONAL HISTORY OF COLONIC POLYPS: Status: ACTIVE | Noted: 2022-11-15

## 2022-11-15 PROCEDURE — 99244 OFF/OP CNSLTJ NEW/EST MOD 40: CPT

## 2022-11-15 RX ORDER — SUCRALFATE 1 G/1
1 TABLET ORAL 4 TIMES DAILY
Qty: 120 TABLET | Refills: 3 | Status: SHIPPED | OUTPATIENT
Start: 2022-11-15 | End: 2022-12-27 | Stop reason: SDUPTHER

## 2022-11-15 RX ORDER — SIMETHICONE 125 MG
TABLET,CHEWABLE ORAL
Qty: 2 TABLET | Refills: 0 | Status: SHIPPED | OUTPATIENT
Start: 2022-11-15 | End: 2022-12-30

## 2022-11-15 RX ORDER — PANTOPRAZOLE SODIUM 40 MG/1
40 TABLET, DELAYED RELEASE ORAL 2 TIMES DAILY
Qty: 180 TABLET | Refills: 3 | Status: SHIPPED | OUTPATIENT
Start: 2022-11-15 | End: 2023-11-15

## 2022-11-15 RX ORDER — BISACODYL 5 MG/1
TABLET, DELAYED RELEASE ORAL
Qty: 2 TABLET | Refills: 0 | Status: SHIPPED | OUTPATIENT
Start: 2022-11-15 | End: 2022-12-30

## 2022-11-15 ASSESSMENT — ENCOUNTER SYMPTOMS
FEVER: 0
COUGH: 1
LIGHT-HEADEDNESS: 0
VOMITING: 0
ABDOMINAL DISTENTION: 0
UNEXPECTED WEIGHT CHANGE: 0
APPETITE CHANGE: 0
CHILLS: 0
SHORTNESS OF BREATH: 0
FATIGUE: 0
COLOR CHANGE: 0
DIARRHEA: 0
RECTAL PAIN: 0
ROS GI COMMENTS: BELCHING
BLOOD IN STOOL: 0
NAUSEA: 0
WEAKNESS: 0
VOICE CHANGE: 1
DIAPHORESIS: 0
ACTIVITY CHANGE: 0
TROUBLE SWALLOWING: 0
ABDOMINAL PAIN: 1
CHOKING: 0
CONSTIPATION: 0
ANAL BLEEDING: 0
DIZZINESS: 0
CONFUSION: 0

## 2022-11-30 ENCOUNTER — OFFICE VISIT (OUTPATIENT)
Dept: FAMILY MEDICINE | Age: 62
End: 2022-11-30

## 2022-11-30 VITALS
TEMPERATURE: 97.5 F | SYSTOLIC BLOOD PRESSURE: 120 MMHG | DIASTOLIC BLOOD PRESSURE: 82 MMHG | HEART RATE: 77 BPM | WEIGHT: 161 LBS | RESPIRATION RATE: 16 BRPM | BODY MASS INDEX: 28.52 KG/M2

## 2022-11-30 DIAGNOSIS — E78.5 DYSLIPIDEMIA: ICD-10-CM

## 2022-11-30 DIAGNOSIS — K21.9 GASTROESOPHAGEAL REFLUX DISEASE, UNSPECIFIED WHETHER ESOPHAGITIS PRESENT: ICD-10-CM

## 2022-11-30 DIAGNOSIS — J45.40 MODERATE PERSISTENT ASTHMA WITHOUT COMPLICATION: ICD-10-CM

## 2022-11-30 DIAGNOSIS — E55.9 VITAMIN D DEFICIENCY: ICD-10-CM

## 2022-11-30 DIAGNOSIS — E11.9 TYPE 2 DIABETES MELLITUS WITHOUT COMPLICATION, WITHOUT LONG-TERM CURRENT USE OF INSULIN (CMD): Primary | ICD-10-CM

## 2022-11-30 DIAGNOSIS — Z23 NEED FOR SHINGLES VACCINE: ICD-10-CM

## 2022-11-30 PROCEDURE — 99214 OFFICE O/P EST MOD 30 MIN: CPT | Performed by: FAMILY MEDICINE

## 2022-11-30 PROCEDURE — 3074F SYST BP LT 130 MM HG: CPT | Performed by: FAMILY MEDICINE

## 2022-11-30 PROCEDURE — 3079F DIAST BP 80-89 MM HG: CPT | Performed by: FAMILY MEDICINE

## 2022-11-30 PROCEDURE — 90750 HZV VACC RECOMBINANT IM: CPT | Performed by: FAMILY MEDICINE

## 2022-11-30 PROCEDURE — 90471 IMMUNIZATION ADMIN: CPT | Performed by: FAMILY MEDICINE

## 2022-11-30 RX ORDER — METFORMIN HYDROCHLORIDE 500 MG/1
500 TABLET, EXTENDED RELEASE ORAL
Qty: 90 TABLET | Refills: 3 | Status: SHIPPED | OUTPATIENT
Start: 2022-11-30

## 2022-11-30 RX ORDER — ATORVASTATIN CALCIUM 10 MG/1
10 TABLET, FILM COATED ORAL DAILY
Qty: 90 TABLET | Refills: 3 | Status: SHIPPED | OUTPATIENT
Start: 2022-11-30

## 2022-12-01 ENCOUNTER — OFFICE VISIT (OUTPATIENT)
Dept: OPHTHALMOLOGY | Age: 62
End: 2022-12-01

## 2022-12-01 ENCOUNTER — OFFICE VISIT (OUTPATIENT)
Dept: OPTOMETRY | Age: 62
End: 2022-12-01
Attending: PHYSICIAN ASSISTANT

## 2022-12-01 DIAGNOSIS — E11.9 TYPE 2 DIABETES MELLITUS WITHOUT RETINOPATHY (CMD): ICD-10-CM

## 2022-12-01 DIAGNOSIS — H52.4 PRESBYOPIA: ICD-10-CM

## 2022-12-01 DIAGNOSIS — H35.372 EPIRETINAL MEMBRANE (ERM) OF LEFT EYE: ICD-10-CM

## 2022-12-01 DIAGNOSIS — H52.223 REGULAR ASTIGMATISM OF BOTH EYES: ICD-10-CM

## 2022-12-01 DIAGNOSIS — H35.372 EPIRETINAL MEMBRANE (ERM) OF LEFT EYE: Primary | ICD-10-CM

## 2022-12-01 DIAGNOSIS — H52.13 MYOPIA OF BOTH EYES: Primary | ICD-10-CM

## 2022-12-01 PROCEDURE — 92015 DETERMINE REFRACTIVE STATE: CPT | Performed by: OPTOMETRIST

## 2022-12-01 PROCEDURE — 92134 CPTRZ OPH DX IMG PST SGM RTA: CPT | Performed by: OPTOMETRIST

## 2022-12-01 PROCEDURE — 92014 COMPRE OPH EXAM EST PT 1/>: CPT | Performed by: OPTOMETRIST

## 2022-12-01 ASSESSMENT — REFRACTION_MANIFEST
OS_SPHERE: -1.50
OS_AXIS: 180
OS_CYLINDER: +0.75
OS_ADD: +2.25
OD_CYLINDER: +1.25
OD_AXIS: 180
OD_SPHERE: -1.75
OD_ADD: +2.25

## 2022-12-01 ASSESSMENT — REFRACTION_WEARINGRX
OD_AXIS: 177
OS_AXIS: 178
OD_CYLINDER: +1.50
OS_SPHERE: -1.50
OS_CYLINDER: +0.75
OD_SPHERE: -2.00

## 2022-12-01 ASSESSMENT — VISUAL ACUITY
OD_CC: J2 @ 18
OS_CC: 20/25
OS_CC+: -2
CORRECTION_TYPE: GLASSES
OD_CC: 20/20
METHOD: SNELLEN - LINEAR
OD_CC+: -1

## 2022-12-01 ASSESSMENT — CONF VISUAL FIELD
OS_NORMAL: 1
OD_NORMAL: 1

## 2022-12-01 ASSESSMENT — KERATOMETRY
OD_K2POWER_DIOPTERS: 45.12
OD_K1POWER_DIOPTERS: 45.62
OS_AXISANGLE_DEGREES: 090
OS_K2POWER_DIOPTERS: 45.12
OS_K1POWER_DIOPTERS: 45.62
OS_AXISANGLE2_DEGREES: 180
OD_AXISANGLE2_DEGREES: 180
OD_AXISANGLE_DEGREES: 090

## 2022-12-01 ASSESSMENT — TONOMETRY
OD_IOP_MMHG: 10
OS_IOP_MMHG: 12

## 2022-12-01 ASSESSMENT — CUP TO DISC RATIO
OS_RATIO: 0.2
OD_RATIO: 0.2

## 2022-12-01 ASSESSMENT — EXTERNAL EXAM - LEFT EYE: OS_EXAM: NORMAL

## 2022-12-01 ASSESSMENT — EXTERNAL EXAM - RIGHT EYE: OD_EXAM: NORMAL

## 2022-12-01 ASSESSMENT — SLIT LAMP EXAM - LIDS
COMMENTS: NORMAL
COMMENTS: NORMAL

## 2022-12-06 RX ORDER — FLUTICASONE FUROATE AND VILANTEROL TRIFENATATE 200; 25 UG/1; UG/1
POWDER RESPIRATORY (INHALATION)
Qty: 60 EACH | Refills: 3 | Status: SHIPPED | OUTPATIENT
Start: 2022-12-06 | End: 2023-05-25 | Stop reason: SDUPTHER

## 2022-12-17 RX ORDER — SUCRALFATE 1 G/1
TABLET ORAL
Qty: 120 TABLET | Refills: 3 | OUTPATIENT
Start: 2022-12-17

## 2022-12-27 ENCOUNTER — E-ADVICE (OUTPATIENT)
Dept: GASTROENTEROLOGY | Age: 62
End: 2022-12-27

## 2022-12-27 ENCOUNTER — E-ADVICE (OUTPATIENT)
Dept: FAMILY MEDICINE | Age: 62
End: 2022-12-27

## 2022-12-28 RX ORDER — SUCRALFATE 1 G/1
1 TABLET ORAL 4 TIMES DAILY
Qty: 120 TABLET | Refills: 3 | Status: SHIPPED | OUTPATIENT
Start: 2022-12-28 | End: 2023-08-07 | Stop reason: SDUPTHER

## 2022-12-28 RX ORDER — SUCRALFATE 1 G/1
TABLET ORAL
Qty: 120 TABLET | Refills: 3 | OUTPATIENT
Start: 2022-12-28

## 2022-12-28 RX ORDER — SUCRALFATE 1 G/1
1 TABLET ORAL 4 TIMES DAILY
Qty: 120 TABLET | Refills: 3 | OUTPATIENT
Start: 2022-12-28 | End: 2023-04-27

## 2023-02-14 ENCOUNTER — TELEPHONE (OUTPATIENT)
Dept: LAB | Age: 63
End: 2023-02-14

## 2023-02-18 ENCOUNTER — LAB SERVICES (OUTPATIENT)
Dept: LAB | Age: 63
End: 2023-02-18

## 2023-02-18 DIAGNOSIS — E55.9 VITAMIN D DEFICIENCY: ICD-10-CM

## 2023-02-18 DIAGNOSIS — E78.5 DYSLIPIDEMIA: ICD-10-CM

## 2023-02-18 DIAGNOSIS — E11.9 TYPE 2 DIABETES MELLITUS WITHOUT COMPLICATION, WITHOUT LONG-TERM CURRENT USE OF INSULIN (CMD): ICD-10-CM

## 2023-02-18 LAB
25(OH)D3+25(OH)D2 SERPL-MCNC: 27.2 NG/ML (ref 30–100)
ALBUMIN SERPL-MCNC: 3.8 G/DL (ref 3.6–5.1)
ALBUMIN/GLOB SERPL: 1.2 {RATIO} (ref 1–2.4)
ALP SERPL-CCNC: 105 UNITS/L (ref 45–117)
ALT SERPL-CCNC: 29 UNITS/L
ANION GAP SERPL CALC-SCNC: 14 MMOL/L (ref 7–19)
AST SERPL-CCNC: 20 UNITS/L
BILIRUB SERPL-MCNC: 0.8 MG/DL (ref 0.2–1)
BUN SERPL-MCNC: 8 MG/DL (ref 6–20)
BUN/CREAT SERPL: 11 (ref 7–25)
CALCIUM SERPL-MCNC: 8.9 MG/DL (ref 8.4–10.2)
CHLORIDE SERPL-SCNC: 105 MMOL/L (ref 97–110)
CHOLEST SERPL-MCNC: 169 MG/DL
CHOLEST/HDLC SERPL: 2 {RATIO}
CO2 SERPL-SCNC: 27 MMOL/L (ref 21–32)
CREAT SERPL-MCNC: 0.73 MG/DL (ref 0.51–0.95)
CREAT UR-MCNC: 107.11 MG/DL
FASTING DURATION TIME PATIENT: ABNORMAL H
FASTING DURATION TIME PATIENT: NORMAL H
GFR SERPLBLD BASED ON 1.73 SQ M-ARVRAT: >90 ML/MIN
GLOBULIN SER-MCNC: 3.1 G/DL (ref 2–4)
GLUCOSE SERPL-MCNC: 101 MG/DL (ref 70–99)
HDLC SERPL-MCNC: 85 MG/DL
LDLC SERPL CALC-MCNC: 65 MG/DL
MICROALBUMIN UR-MCNC: <0.5 MG/DL
MICROALBUMIN/CREAT UR: NORMAL MG/G{CREAT}
NONHDLC SERPL-MCNC: 84 MG/DL
POTASSIUM SERPL-SCNC: 3.9 MMOL/L (ref 3.4–5.1)
PROT SERPL-MCNC: 6.9 G/DL (ref 6.4–8.2)
SODIUM SERPL-SCNC: 142 MMOL/L (ref 135–145)
TRIGL SERPL-MCNC: 96 MG/DL

## 2023-02-18 PROCEDURE — 82306 VITAMIN D 25 HYDROXY: CPT | Performed by: INTERNAL MEDICINE

## 2023-02-18 PROCEDURE — 82570 ASSAY OF URINE CREATININE: CPT | Performed by: INTERNAL MEDICINE

## 2023-02-18 PROCEDURE — 80053 COMPREHEN METABOLIC PANEL: CPT | Performed by: INTERNAL MEDICINE

## 2023-02-18 PROCEDURE — 36415 COLL VENOUS BLD VENIPUNCTURE: CPT | Performed by: FAMILY MEDICINE

## 2023-02-18 PROCEDURE — 82043 UR ALBUMIN QUANTITATIVE: CPT | Performed by: INTERNAL MEDICINE

## 2023-02-18 PROCEDURE — 83036 HEMOGLOBIN GLYCOSYLATED A1C: CPT | Performed by: INTERNAL MEDICINE

## 2023-02-18 PROCEDURE — 80061 LIPID PANEL: CPT | Performed by: INTERNAL MEDICINE

## 2023-02-19 LAB — HBA1C MFR BLD: 6.8 % (ref 4.5–5.6)

## 2023-02-21 ENCOUNTER — APPOINTMENT (OUTPATIENT)
Dept: LAB | Age: 63
End: 2023-02-21

## 2023-02-21 ENCOUNTER — E-ADVICE (OUTPATIENT)
Dept: GASTROENTEROLOGY | Age: 63
End: 2023-02-21

## 2023-02-21 DIAGNOSIS — R05.3 CHRONIC COUGH: Primary | ICD-10-CM

## 2023-02-21 DIAGNOSIS — R10.13 EPIGASTRIC PAIN: ICD-10-CM

## 2023-02-21 DIAGNOSIS — Z86.010 HX OF COLONIC POLYPS: ICD-10-CM

## 2023-02-22 ENCOUNTER — TELEPHONE (OUTPATIENT)
Dept: GASTROENTEROLOGY | Age: 63
End: 2023-02-22

## 2023-02-22 RX ORDER — SIMETHICONE 125 MG
TABLET,CHEWABLE ORAL
Qty: 2 TABLET | Refills: 0 | Status: SHIPPED | OUTPATIENT
Start: 2023-02-22 | End: 2023-04-08

## 2023-02-22 RX ORDER — BISACODYL 5 MG/1
TABLET, DELAYED RELEASE ORAL
Qty: 2 TABLET | Refills: 0 | Status: SHIPPED | OUTPATIENT
Start: 2023-02-22 | End: 2023-04-08

## 2023-02-23 ENCOUNTER — E-ADVICE (OUTPATIENT)
Dept: OPTOMETRY | Age: 63
End: 2023-02-23

## 2023-02-23 ENCOUNTER — E-ADVICE (OUTPATIENT)
Dept: GASTROENTEROLOGY | Age: 63
End: 2023-02-23

## 2023-02-23 ENCOUNTER — E-ADVICE (OUTPATIENT)
Dept: FAMILY MEDICINE | Age: 63
End: 2023-02-23

## 2023-02-24 ENCOUNTER — HOSPITAL ENCOUNTER (OUTPATIENT)
Dept: GASTROENTEROLOGY | Age: 63
End: 2023-02-24
Attending: INTERNAL MEDICINE

## 2023-03-09 ENCOUNTER — TELEPHONE (OUTPATIENT)
Dept: PULMONOLOGY | Age: 63
End: 2023-03-09

## 2023-03-10 ENCOUNTER — OFFICE VISIT (OUTPATIENT)
Dept: PULMONOLOGY | Age: 63
End: 2023-03-10
Attending: INTERNAL MEDICINE

## 2023-03-10 VITALS — WEIGHT: 155.42 LBS | BODY MASS INDEX: 27.54 KG/M2 | HEIGHT: 63 IN

## 2023-03-10 DIAGNOSIS — J45.50 SEVERE PERSISTENT ASTHMA WITHOUT COMPLICATION: ICD-10-CM

## 2023-03-10 PROCEDURE — 94729 DIFFUSING CAPACITY: CPT | Performed by: INTERNAL MEDICINE

## 2023-03-10 PROCEDURE — 94010 BREATHING CAPACITY TEST: CPT | Performed by: INTERNAL MEDICINE

## 2023-03-10 PROCEDURE — 94726 PLETHYSMOGRAPHY LUNG VOLUMES: CPT | Performed by: INTERNAL MEDICINE

## 2023-03-10 RX ORDER — BENZONATATE 100 MG/1
200 CAPSULE ORAL 3 TIMES DAILY PRN
Qty: 30 CAPSULE | Refills: 0 | Status: SHIPPED | OUTPATIENT
Start: 2023-03-10 | End: 2023-03-15 | Stop reason: SDUPTHER

## 2023-03-14 ENCOUNTER — E-ADVICE (OUTPATIENT)
Dept: PULMONOLOGY | Age: 63
End: 2023-03-14

## 2023-03-14 RX ORDER — BENZONATATE 100 MG/1
CAPSULE ORAL
Qty: 30 CAPSULE | Refills: 0 | OUTPATIENT
Start: 2023-03-14

## 2023-03-14 RX ORDER — BENZONATATE 100 MG/1
200 CAPSULE ORAL 3 TIMES DAILY PRN
Qty: 30 CAPSULE | Refills: 0 | Status: CANCELLED | OUTPATIENT
Start: 2023-03-14

## 2023-03-16 ENCOUNTER — APPOINTMENT (OUTPATIENT)
Dept: GASTROENTEROLOGY | Age: 63
End: 2023-03-16
Attending: PHYSICIAN ASSISTANT

## 2023-03-16 RX ORDER — BENZONATATE 100 MG/1
200 CAPSULE ORAL 3 TIMES DAILY PRN
Qty: 30 CAPSULE | Refills: 0 | Status: SHIPPED | OUTPATIENT
Start: 2023-03-16 | End: 2023-05-04 | Stop reason: ALTCHOICE

## 2023-03-22 ENCOUNTER — E-ADVICE (OUTPATIENT)
Dept: FAMILY MEDICINE | Age: 63
End: 2023-03-22

## 2023-03-22 DIAGNOSIS — E04.2 MULTIPLE THYROID NODULES: Primary | ICD-10-CM

## 2023-04-03 ENCOUNTER — WALK IN (OUTPATIENT)
Dept: URGENT CARE | Age: 63
End: 2023-04-03

## 2023-04-03 VITALS
TEMPERATURE: 97.4 F | HEART RATE: 113 BPM | DIASTOLIC BLOOD PRESSURE: 75 MMHG | OXYGEN SATURATION: 97 % | RESPIRATION RATE: 18 BRPM | SYSTOLIC BLOOD PRESSURE: 131 MMHG

## 2023-04-03 DIAGNOSIS — J40 BRONCHITIS: ICD-10-CM

## 2023-04-03 DIAGNOSIS — J01.00 ACUTE NON-RECURRENT MAXILLARY SINUSITIS: ICD-10-CM

## 2023-04-03 DIAGNOSIS — R50.9 FEVER IN ADULT: Primary | ICD-10-CM

## 2023-04-03 LAB
FLUAV AG UPPER RESP QL IA.RAPID: NEGATIVE
FLUBV AG UPPER RESP QL IA.RAPID: NEGATIVE
SARS-COV+SARS-COV-2 AG RESP QL IA.RAPID: NOT DETECTED
TEST LOT EXPIRATION DATE: NORMAL
TEST LOT NUMBER: NORMAL

## 2023-04-03 PROCEDURE — 87428 SARSCOV & INF VIR A&B AG IA: CPT | Performed by: EMERGENCY MEDICINE

## 2023-04-03 PROCEDURE — 99214 OFFICE O/P EST MOD 30 MIN: CPT | Performed by: EMERGENCY MEDICINE

## 2023-04-03 RX ORDER — METHYLPREDNISOLONE 4 MG
TABLET, DOSE PACK ORAL
Qty: 21 TABLET | Refills: 0 | Status: SHIPPED | OUTPATIENT
Start: 2023-04-03 | End: 2023-04-09

## 2023-04-03 RX ORDER — AZITHROMYCIN 500 MG/1
500 TABLET, FILM COATED ORAL DAILY
Qty: 5 TABLET | Refills: 0 | Status: SHIPPED | OUTPATIENT
Start: 2023-04-03 | End: 2023-05-04 | Stop reason: ALTCHOICE

## 2023-04-03 RX ORDER — BENZONATATE 200 MG/1
200 CAPSULE ORAL 3 TIMES DAILY PRN
Qty: 20 CAPSULE | Refills: 0 | Status: SHIPPED | OUTPATIENT
Start: 2023-04-03 | End: 2023-05-04 | Stop reason: ALTCHOICE

## 2023-05-04 ENCOUNTER — WALK IN (OUTPATIENT)
Dept: URGENT CARE | Age: 63
End: 2023-05-04

## 2023-05-04 ENCOUNTER — APPOINTMENT (OUTPATIENT)
Dept: ULTRASOUND IMAGING | Age: 63
End: 2023-05-04

## 2023-05-04 VITALS
TEMPERATURE: 97.6 F | OXYGEN SATURATION: 97 % | RESPIRATION RATE: 16 BRPM | DIASTOLIC BLOOD PRESSURE: 70 MMHG | HEART RATE: 74 BPM | SYSTOLIC BLOOD PRESSURE: 114 MMHG

## 2023-05-04 DIAGNOSIS — J32.9 SINUSITIS, UNSPECIFIED CHRONICITY, UNSPECIFIED LOCATION: Primary | ICD-10-CM

## 2023-05-04 PROCEDURE — 99214 OFFICE O/P EST MOD 30 MIN: CPT | Performed by: FAMILY MEDICINE

## 2023-05-04 RX ORDER — PREDNISONE 20 MG/1
40 TABLET ORAL DAILY
Qty: 10 TABLET | Refills: 0 | Status: SHIPPED | OUTPATIENT
Start: 2023-05-04 | End: 2023-05-09

## 2023-05-04 RX ORDER — DOXYCYCLINE HYCLATE 100 MG
100 TABLET ORAL 2 TIMES DAILY
Qty: 20 TABLET | Refills: 0 | Status: SHIPPED | OUTPATIENT
Start: 2023-05-04 | End: 2023-05-14

## 2023-05-10 ENCOUNTER — TELEPHONE (OUTPATIENT)
Dept: GASTROENTEROLOGY | Age: 63
End: 2023-05-10

## 2023-05-10 DIAGNOSIS — R05.3 CHRONIC COUGH: Primary | ICD-10-CM

## 2023-05-10 DIAGNOSIS — Z86.010 PERSONAL HISTORY OF COLONIC POLYPS: ICD-10-CM

## 2023-05-12 ENCOUNTER — APPOINTMENT (OUTPATIENT)
Dept: PULMONOLOGY | Age: 63
End: 2023-05-12

## 2023-05-18 ENCOUNTER — IMAGING SERVICES (OUTPATIENT)
Dept: ULTRASOUND IMAGING | Age: 63
End: 2023-05-18

## 2023-05-18 DIAGNOSIS — E04.2 MULTIPLE THYROID NODULES: ICD-10-CM

## 2023-05-18 PROCEDURE — 76536 US EXAM OF HEAD AND NECK: CPT | Performed by: STUDENT IN AN ORGANIZED HEALTH CARE EDUCATION/TRAINING PROGRAM

## 2023-05-24 ENCOUNTER — TELEPHONE (OUTPATIENT)
Dept: FAMILY MEDICINE | Age: 63
End: 2023-05-24

## 2023-05-24 DIAGNOSIS — E04.1 THYROID NODULE: Primary | ICD-10-CM

## 2023-05-25 ENCOUNTER — E-ADVICE (OUTPATIENT)
Dept: PULMONOLOGY | Age: 63
End: 2023-05-25

## 2023-05-25 ENCOUNTER — V-VISIT (OUTPATIENT)
Dept: PULMONOLOGY | Age: 63
End: 2023-05-25

## 2023-05-25 DIAGNOSIS — R05.2 SUBACUTE COUGH: ICD-10-CM

## 2023-05-25 DIAGNOSIS — J32.9 RHINOSINUSITIS: ICD-10-CM

## 2023-05-25 DIAGNOSIS — J45.50 SEVERE PERSISTENT ASTHMA WITHOUT COMPLICATION: Primary | ICD-10-CM

## 2023-05-25 DIAGNOSIS — J30.9 ALLERGIC RHINITIS, UNSPECIFIED SEASONALITY, UNSPECIFIED TRIGGER: ICD-10-CM

## 2023-05-25 PROCEDURE — 99214 OFFICE O/P EST MOD 30 MIN: CPT | Performed by: INTERNAL MEDICINE

## 2023-05-25 RX ORDER — BENZONATATE 100 MG/1
200 CAPSULE ORAL 3 TIMES DAILY PRN
Qty: 60 CAPSULE | Refills: 0 | Status: SHIPPED | OUTPATIENT
Start: 2023-05-25

## 2023-05-25 RX ORDER — ALBUTEROL SULFATE 2.5 MG/3ML
2.5 SOLUTION RESPIRATORY (INHALATION) EVERY 6 HOURS PRN
Qty: 375 ML | Refills: 1 | Status: SHIPPED | OUTPATIENT
Start: 2023-05-25

## 2023-05-25 RX ORDER — FLUTICASONE FUROATE AND VILANTEROL 200; 25 UG/1; UG/1
1 POWDER RESPIRATORY (INHALATION) DAILY
Qty: 3 EACH | Refills: 1 | Status: SHIPPED | OUTPATIENT
Start: 2023-05-25

## 2023-05-26 RX ORDER — ALBUTEROL SULFATE 90 UG/1
2 AEROSOL, METERED RESPIRATORY (INHALATION) EVERY 6 HOURS PRN
Qty: 1 EACH | Refills: 5 | Status: SHIPPED | OUTPATIENT
Start: 2023-05-26

## 2023-05-30 ENCOUNTER — OFFICE VISIT (OUTPATIENT)
Dept: OTOLARYNGOLOGY | Age: 63
End: 2023-05-30

## 2023-05-30 VITALS — WEIGHT: 142 LBS | HEIGHT: 63 IN | BODY MASS INDEX: 25.16 KG/M2

## 2023-05-30 DIAGNOSIS — E04.1 THYROID NODULE: ICD-10-CM

## 2023-05-30 DIAGNOSIS — J32.9 CHRONIC SINUSITIS, UNSPECIFIED LOCATION: Primary | ICD-10-CM

## 2023-05-30 PROCEDURE — 31231 NASAL ENDOSCOPY DX: CPT | Performed by: OTOLARYNGOLOGY

## 2023-05-30 PROCEDURE — 99214 OFFICE O/P EST MOD 30 MIN: CPT | Performed by: OTOLARYNGOLOGY

## 2023-05-30 RX ORDER — AZELASTINE 1 MG/ML
1 SPRAY, METERED NASAL 2 TIMES DAILY
Qty: 30 ML | Refills: 12 | Status: SHIPPED | OUTPATIENT
Start: 2023-05-30

## 2023-05-30 RX ORDER — CEFDINIR 300 MG/1
300 CAPSULE ORAL 2 TIMES DAILY
Qty: 20 CAPSULE | Refills: 0 | Status: SHIPPED | OUTPATIENT
Start: 2023-05-30 | End: 2023-06-09

## 2023-05-30 RX ORDER — FLUTICASONE PROPIONATE 50 MCG
2 SPRAY, SUSPENSION (ML) NASAL DAILY
Qty: 16 G | Refills: 12 | Status: SHIPPED | OUTPATIENT
Start: 2023-05-30

## 2023-08-03 ENCOUNTER — IMAGING SERVICES (OUTPATIENT)
Dept: CT IMAGING | Age: 63
End: 2023-08-03
Attending: INTERNAL MEDICINE

## 2023-08-03 ENCOUNTER — IMAGING SERVICES (OUTPATIENT)
Dept: CT IMAGING | Age: 63
End: 2023-08-03
Attending: OTOLARYNGOLOGY

## 2023-08-03 ENCOUNTER — TELEPHONE (OUTPATIENT)
Dept: OTOLARYNGOLOGY | Age: 63
End: 2023-08-03

## 2023-08-03 DIAGNOSIS — E04.1 THYROID NODULE: Primary | ICD-10-CM

## 2023-08-03 DIAGNOSIS — R05.2 SUBACUTE COUGH: ICD-10-CM

## 2023-08-03 DIAGNOSIS — J32.9 CHRONIC SINUSITIS, UNSPECIFIED LOCATION: ICD-10-CM

## 2023-08-03 PROCEDURE — 71250 CT THORAX DX C-: CPT | Performed by: STUDENT IN AN ORGANIZED HEALTH CARE EDUCATION/TRAINING PROGRAM

## 2023-08-03 PROCEDURE — 70486 CT MAXILLOFACIAL W/O DYE: CPT | Performed by: RADIOLOGY

## 2023-08-04 ENCOUNTER — E-ADVICE (OUTPATIENT)
Dept: PULMONOLOGY | Age: 63
End: 2023-08-04

## 2023-08-04 ENCOUNTER — APPOINTMENT (OUTPATIENT)
Dept: OTOLARYNGOLOGY | Age: 63
End: 2023-08-04
Attending: FAMILY MEDICINE

## 2023-08-07 RX ORDER — SUCRALFATE 1 G/1
1 TABLET ORAL 4 TIMES DAILY
Qty: 120 TABLET | Refills: 3 | Status: SHIPPED | OUTPATIENT
Start: 2023-08-07 | End: 2023-12-05

## 2023-09-07 ENCOUNTER — E-ADVICE (OUTPATIENT)
Dept: ALLERGY | Age: 63
End: 2023-09-07

## 2023-09-12 ENCOUNTER — TELEPHONE (OUTPATIENT)
Dept: PULMONOLOGY | Age: 63
End: 2023-09-12

## 2023-09-15 ENCOUNTER — APPOINTMENT (OUTPATIENT)
Dept: ALLERGY | Age: 63
End: 2023-09-15

## 2023-09-19 ENCOUNTER — APPOINTMENT (OUTPATIENT)
Dept: OTOLARYNGOLOGY | Age: 63
End: 2023-09-19
Attending: FAMILY MEDICINE

## 2023-10-03 ENCOUNTER — E-ADVICE (OUTPATIENT)
Dept: FAMILY MEDICINE | Age: 63
End: 2023-10-03

## 2023-10-04 ENCOUNTER — TELEPHONE (OUTPATIENT)
Dept: FAMILY MEDICINE | Age: 63
End: 2023-10-04

## 2023-10-05 ENCOUNTER — APPOINTMENT (OUTPATIENT)
Dept: ULTRASOUND IMAGING | Age: 63
End: 2023-10-05
Attending: OTOLARYNGOLOGY

## 2023-10-06 ENCOUNTER — APPOINTMENT (OUTPATIENT)
Dept: PULMONOLOGY | Age: 63
End: 2023-10-06

## 2023-10-06 ENCOUNTER — NURSE ONLY (OUTPATIENT)
Dept: FAMILY MEDICINE | Age: 63
End: 2023-10-06

## 2023-10-06 DIAGNOSIS — Z29.11 NEED FOR RSV VACCINATION: Primary | ICD-10-CM

## 2023-10-06 PROCEDURE — 90471 IMMUNIZATION ADMIN: CPT | Performed by: FAMILY MEDICINE

## 2023-10-06 PROCEDURE — 90679 RSV VACC PREF RECOMB ADJT IM: CPT | Performed by: FAMILY MEDICINE

## 2023-10-12 ENCOUNTER — APPOINTMENT (OUTPATIENT)
Dept: MAMMOGRAPHY | Age: 63
End: 2023-10-12
Attending: FAMILY MEDICINE

## 2023-10-20 ENCOUNTER — IMMUNIZATION (OUTPATIENT)
Dept: FAMILY MEDICINE | Age: 63
End: 2023-10-20

## 2023-10-20 DIAGNOSIS — Z23 NEED FOR VACCINATION: Primary | ICD-10-CM

## 2023-10-20 PROCEDURE — 90686 IIV4 VACC NO PRSV 0.5 ML IM: CPT | Performed by: FAMILY MEDICINE

## 2023-10-20 PROCEDURE — 90471 IMMUNIZATION ADMIN: CPT | Performed by: FAMILY MEDICINE

## 2023-10-24 ENCOUNTER — APPOINTMENT (OUTPATIENT)
Dept: OTOLARYNGOLOGY | Age: 63
End: 2023-10-24
Attending: FAMILY MEDICINE

## 2023-11-01 ENCOUNTER — IMAGING SERVICES (OUTPATIENT)
Dept: MAMMOGRAPHY | Age: 63
End: 2023-11-01
Attending: FAMILY MEDICINE

## 2023-11-01 DIAGNOSIS — Z12.31 VISIT FOR SCREENING MAMMOGRAM: ICD-10-CM

## 2023-11-01 PROCEDURE — 77067 SCR MAMMO BI INCL CAD: CPT | Performed by: RADIOLOGY

## 2023-11-01 PROCEDURE — 77063 BREAST TOMOSYNTHESIS BI: CPT | Performed by: RADIOLOGY

## 2023-11-09 ENCOUNTER — IMAGING SERVICES (OUTPATIENT)
Dept: ULTRASOUND IMAGING | Age: 63
End: 2023-11-09
Attending: OTOLARYNGOLOGY

## 2023-11-09 DIAGNOSIS — E04.1 THYROID NODULE: ICD-10-CM

## 2023-11-09 PROCEDURE — 88172 CYTP DX EVAL FNA 1ST EA SITE: CPT | Performed by: INTERNAL MEDICINE

## 2023-11-09 PROCEDURE — 88173 CYTOPATH EVAL FNA REPORT: CPT | Performed by: INTERNAL MEDICINE

## 2023-11-09 PROCEDURE — 10005 FNA BX W/US GDN 1ST LES: CPT | Performed by: STUDENT IN AN ORGANIZED HEALTH CARE EDUCATION/TRAINING PROGRAM

## 2023-11-09 PROCEDURE — 10005 FNA BX W/US GDN 1ST LES: CPT | Performed by: INTERNAL MEDICINE

## 2023-11-09 RX ORDER — LIDOCAINE HYDROCHLORIDE 10 MG/ML
10 INJECTION, SOLUTION INFILTRATION; PERINEURAL ONCE
Status: COMPLETED | OUTPATIENT
Start: 2023-11-09 | End: 2023-11-09

## 2023-11-09 RX ADMIN — LIDOCAINE HYDROCHLORIDE 100 MG: 10 INJECTION, SOLUTION INFILTRATION; PERINEURAL at 14:40

## 2023-11-10 LAB
ASR DISCLAIMER: NORMAL
CASE RPRT: NORMAL
CLINICAL INFO: NORMAL
PATH REPORT.FINAL DX SPEC: NORMAL
PATH REPORT.FINAL DX SPEC: NORMAL
PATH REPORT.GROSS SPEC: NORMAL
PATH REPORT.INTRAOP OBS SPEC DOC: NORMAL
PATH REPORT.MICROSCOPIC SPEC OTHER STN: NORMAL

## 2023-11-14 ENCOUNTER — TELEPHONE (OUTPATIENT)
Dept: OTOLARYNGOLOGY | Age: 63
End: 2023-11-14

## 2023-11-18 ENCOUNTER — OFFICE VISIT (OUTPATIENT)
Dept: OTOLARYNGOLOGY | Age: 63
End: 2023-11-18

## 2023-11-18 ENCOUNTER — TELEPHONE (OUTPATIENT)
Dept: OTOLARYNGOLOGY | Age: 63
End: 2023-11-18

## 2023-11-18 DIAGNOSIS — R05.8 OTHER COUGH: Primary | ICD-10-CM

## 2023-11-18 DIAGNOSIS — E04.1 THYROID NODULE: ICD-10-CM

## 2023-11-18 DIAGNOSIS — J32.8 OTHER CHRONIC SINUSITIS: ICD-10-CM

## 2023-11-18 PROCEDURE — 99214 OFFICE O/P EST MOD 30 MIN: CPT | Performed by: OTOLARYNGOLOGY

## 2023-11-21 ENCOUNTER — APPOINTMENT (OUTPATIENT)
Dept: OTOLARYNGOLOGY | Age: 63
End: 2023-11-21

## 2023-11-22 ENCOUNTER — TELEPHONE (OUTPATIENT)
Dept: GASTROENTEROLOGY | Age: 63
End: 2023-11-22

## 2023-11-28 ENCOUNTER — APPOINTMENT (OUTPATIENT)
Dept: PULMONOLOGY | Age: 63
End: 2023-11-28

## 2023-11-28 ENCOUNTER — LAB SERVICES (OUTPATIENT)
Dept: LAB | Age: 63
End: 2023-11-28

## 2023-11-28 VITALS
RESPIRATION RATE: 16 BRPM | HEIGHT: 63 IN | DIASTOLIC BLOOD PRESSURE: 78 MMHG | OXYGEN SATURATION: 97 % | SYSTOLIC BLOOD PRESSURE: 122 MMHG | BODY MASS INDEX: 27.41 KG/M2 | WEIGHT: 154.72 LBS | HEART RATE: 93 BPM

## 2023-11-28 DIAGNOSIS — J45.50 SEVERE PERSISTENT ASTHMA WITHOUT COMPLICATION: ICD-10-CM

## 2023-11-28 DIAGNOSIS — J47.9 BRONCHIECTASIS WITHOUT COMPLICATION (CMD): ICD-10-CM

## 2023-11-28 DIAGNOSIS — A31.0 MAI (MYCOBACTERIUM AVIUM-INTRACELLULARE) (CMD): ICD-10-CM

## 2023-11-28 DIAGNOSIS — J45.50 SEVERE PERSISTENT ASTHMA WITHOUT COMPLICATION: Primary | ICD-10-CM

## 2023-11-28 LAB
BASOPHILS # BLD: 0 K/MCL (ref 0–0.3)
BASOPHILS NFR BLD: 1 %
DEPRECATED RDW RBC: 36.3 FL (ref 39–50)
EOSINOPHIL # BLD: 0.2 K/MCL (ref 0–0.5)
EOSINOPHIL NFR BLD: 3 %
ERYTHROCYTE [DISTWIDTH] IN BLOOD: 14.6 % (ref 11–15)
HCT VFR BLD CALC: 37.3 % (ref 36–46.5)
HGB BLD-MCNC: 11.3 G/DL (ref 12–15.5)
IMM GRANULOCYTES # BLD AUTO: 0 K/MCL (ref 0–0.2)
IMM GRANULOCYTES # BLD: 1 %
LYMPHOCYTES # BLD: 1.4 K/MCL (ref 1–4)
LYMPHOCYTES NFR BLD: 22 %
MCH RBC QN AUTO: 21.4 PG (ref 26–34)
MCHC RBC AUTO-ENTMCNC: 30.3 G/DL (ref 32–36.5)
MCV RBC AUTO: 70.8 FL (ref 78–100)
MONOCYTES # BLD: 0.3 K/MCL (ref 0.3–0.9)
MONOCYTES NFR BLD: 5 %
NEUTROPHILS # BLD: 4.6 K/MCL (ref 1.8–7.7)
NEUTROPHILS NFR BLD: 68 %
NRBC BLD MANUAL-RTO: 0 /100 WBC
PLATELET # BLD AUTO: 296 K/MCL (ref 140–450)
RBC # BLD: 5.27 MIL/MCL (ref 4–5.2)
WBC # BLD: 6.6 K/MCL (ref 4.2–11)

## 2023-11-28 PROCEDURE — 99214 OFFICE O/P EST MOD 30 MIN: CPT | Performed by: INTERNAL MEDICINE

## 2023-11-28 PROCEDURE — 82785 ASSAY OF IGE: CPT | Performed by: CLINICAL MEDICAL LABORATORY

## 2023-11-28 PROCEDURE — 86003 ALLG SPEC IGE CRUDE XTRC EA: CPT | Performed by: INTERNAL MEDICINE

## 2023-11-28 PROCEDURE — 36415 COLL VENOUS BLD VENIPUNCTURE: CPT | Performed by: INTERNAL MEDICINE

## 2023-11-28 PROCEDURE — 85025 COMPLETE CBC W/AUTO DIFF WBC: CPT | Performed by: INTERNAL MEDICINE

## 2023-11-29 LAB — IGE SERPL-ACNC: 8.6 IUNITS/ML

## 2023-11-30 LAB — ALLERGEN: ASPERGILLUS FUMIGATUS, IGE - SEQUOIA: <0.1 KU/L

## 2023-12-04 ENCOUNTER — APPOINTMENT (OUTPATIENT)
Dept: GASTROENTEROLOGY | Age: 63
End: 2023-12-04
Attending: INTERNAL MEDICINE

## 2023-12-26 RX ORDER — CETIRIZINE HYDROCHLORIDE 10 MG/1
TABLET ORAL
Qty: 90 TABLET | Refills: 2 | Status: SHIPPED | OUTPATIENT
Start: 2023-12-26

## 2024-02-05 ENCOUNTER — E-ADVICE (OUTPATIENT)
Dept: PULMONOLOGY | Age: 64
End: 2024-02-05

## 2024-02-05 RX ORDER — PREDNISONE 20 MG/1
40 TABLET ORAL DAILY
Qty: 10 TABLET | Refills: 0 | Status: SHIPPED | OUTPATIENT
Start: 2024-02-05 | End: 2024-02-10

## 2024-03-11 ENCOUNTER — E-ADVICE (OUTPATIENT)
Dept: GASTROENTEROLOGY | Age: 64
End: 2024-03-11

## 2024-03-14 ENCOUNTER — E-ADVICE (OUTPATIENT)
Dept: ALLERGY | Age: 64
End: 2024-03-14

## 2024-03-20 ASSESSMENT — ENCOUNTER SYMPTOMS
FACIAL SWELLING: 0
APPETITE CHANGE: 0
FEVER: 0
HEADACHES: 0
NERVOUS/ANXIOUS: 0
VOMITING: 0
DIARRHEA: 0
SORE THROAT: 0
ADENOPATHY: 0
SLEEP DISTURBANCE: 0
ABDOMINAL PAIN: 0

## 2024-03-22 ENCOUNTER — APPOINTMENT (OUTPATIENT)
Dept: ALLERGY | Age: 64
End: 2024-03-22

## 2024-03-22 ENCOUNTER — LAB SERVICES (OUTPATIENT)
Dept: LAB | Age: 64
End: 2024-03-22

## 2024-03-22 ENCOUNTER — E-ADVICE (OUTPATIENT)
Dept: ALLERGY | Age: 64
End: 2024-03-22

## 2024-03-22 VITALS
HEART RATE: 80 BPM | BODY MASS INDEX: 26.05 KG/M2 | SYSTOLIC BLOOD PRESSURE: 108 MMHG | HEIGHT: 63 IN | DIASTOLIC BLOOD PRESSURE: 72 MMHG | WEIGHT: 147 LBS | TEMPERATURE: 97.4 F | OXYGEN SATURATION: 98 %

## 2024-03-22 DIAGNOSIS — J47.9 BRONCHIECTASIS WITHOUT COMPLICATION (CMD): ICD-10-CM

## 2024-03-22 DIAGNOSIS — T78.1XXA ADVERSE FOOD REACTION, INITIAL ENCOUNTER: ICD-10-CM

## 2024-03-22 DIAGNOSIS — J31.0 CHRONIC RHINITIS: ICD-10-CM

## 2024-03-22 DIAGNOSIS — B99.9 RECURRENT INFECTIONS: ICD-10-CM

## 2024-03-22 DIAGNOSIS — R79.89 LOW VITAMIN D LEVEL: ICD-10-CM

## 2024-03-22 DIAGNOSIS — J45.40 MODERATE PERSISTENT ASTHMA WITHOUT COMPLICATION: Primary | ICD-10-CM

## 2024-03-22 DIAGNOSIS — J45.40 MODERATE PERSISTENT ASTHMA WITHOUT COMPLICATION: ICD-10-CM

## 2024-03-22 LAB
BASOPHILS # BLD: 0 K/MCL (ref 0–0.3)
BASOPHILS NFR BLD: 1 %
DEPRECATED RDW RBC: 38.5 FL (ref 39–50)
EOSINOPHIL # BLD: 0.2 K/MCL (ref 0–0.5)
EOSINOPHIL NFR BLD: 3 %
ERYTHROCYTE [DISTWIDTH] IN BLOOD: 15 % (ref 11–15)
HCT VFR BLD CALC: 38 % (ref 36–46.5)
HGB BLD-MCNC: 11.5 G/DL (ref 12–15.5)
IGA SERPL-MCNC: 357 MG/DL (ref 70–400)
IGE SERPL-ACNC: 4.4 IUNITS/ML
IGG SERPL-MCNC: 944 MG/DL (ref 700–1600)
IGM SERPL-MCNC: 72 MG/DL (ref 40–230)
IMM GRANULOCYTES # BLD AUTO: 0 K/MCL (ref 0–0.2)
IMM GRANULOCYTES # BLD: 0 %
LYMPHOCYTES # BLD: 1.7 K/MCL (ref 1–4)
LYMPHOCYTES NFR BLD: 27 %
MCH RBC QN AUTO: 21.9 PG (ref 26–34)
MCHC RBC AUTO-ENTMCNC: 30.3 G/DL (ref 32–36.5)
MCV RBC AUTO: 72.2 FL (ref 78–100)
MONOCYTES # BLD: 0.6 K/MCL (ref 0.3–0.9)
MONOCYTES NFR BLD: 9 %
NEUTROPHILS # BLD: 3.9 K/MCL (ref 1.8–7.7)
NEUTROPHILS NFR BLD: 60 %
NRBC BLD MANUAL-RTO: 0 /100 WBC
PLATELET # BLD AUTO: 318 K/MCL (ref 140–450)
RBC # BLD: 5.26 MIL/MCL (ref 4–5.2)
WBC # BLD: 6.3 K/MCL (ref 4.2–11)

## 2024-03-22 RX ORDER — ALBUTEROL SULFATE 90 UG/1
2 AEROSOL, METERED RESPIRATORY (INHALATION) EVERY 4 HOURS PRN
Qty: 2 EACH | Refills: 0 | Status: SHIPPED | OUTPATIENT
Start: 2024-03-22

## 2024-03-22 RX ORDER — MONTELUKAST SODIUM 10 MG/1
10 TABLET ORAL NIGHTLY
Qty: 90 TABLET | Refills: 3 | Status: SHIPPED | OUTPATIENT
Start: 2024-03-22

## 2024-03-22 RX ORDER — FLUTICASONE FUROATE AND VILANTEROL 200; 25 UG/1; UG/1
1 POWDER RESPIRATORY (INHALATION) DAILY
Qty: 3 EACH | Refills: 3 | Status: SHIPPED | OUTPATIENT
Start: 2024-03-22

## 2024-03-22 ASSESSMENT — ENCOUNTER SYMPTOMS
SHORTNESS OF BREATH: 1
WHEEZING: 1
CHEST TIGHTNESS: 1
COUGH: 1

## 2024-03-25 LAB
ALLERGEN:  CLADOSPORIUM HERBARUM (HORMODENDRUM), IGE - SEQUOIA: <0.1 KU/L
ALLERGEN:  COCKROACH (GERMAN), IGE - SEQUOIA: <0.1 KU/L
ALLERGEN:  COCKSFOOT GRASS (ORCHARD), IGE - SEQUOIA: <0.1 KU/L
ALLERGEN:  COD FISH, IGE - SEQUOIA: <0.1 KU/L
ALLERGEN:  D. FARINAE, IGE - SEQUOIA: <0.1 KU/L
ALLERGEN:  D. PTERONYSSINUS, IGE - SEQUOIA: <0.1 KU/L
ALLERGEN:  DOG DANDER, IGE - SEQUOIA: <0.1 KU/L
ALLERGEN: ALTERNARIA TENUIS, IGE - SEQUOIA: <0.1 KU/L
ALLERGEN: ASPERGILLUS FUMIGATUS, IGE - SEQUOIA: <0.1 KU/L
ALLERGEN: ASPERGILLUS NIGER, IGE - SEQUOIA: <0.1 KU/L
ALLERGEN: AUREOBASIDIUM PULLULANS, IGE - SEQUOIA: <0.1 KU/L
ALLERGEN: BEECH TREE, IGE - SEQUOIA: <0.1 KU/L
ALLERGEN: BERMUDA GRASS, IGE - SEQUOIA: <0.1 KU/L
ALLERGEN: BIRCH TREE, IGE - SEQUOIA: <0.1 KU/L
ALLERGEN: BOX ELDER TREE, IGE - SEQUOIA: <0.1 KU/L
ALLERGEN: CAT DANDER, IGE - SEQUOIA: <0.1 KU/L
ALLERGEN: CEDAR OR RED CEDAR TREE - SEQUOIA: <0.1 KU/L
ALLERGEN: ELM TREE, IGE - SEQUOIA: <0.1 KU/L
ALLERGEN: ENGLISH PLANTAIN, IGE - SEQUOIA: <0.1 KU/L
ALLERGEN: EPICOCCUM PURPURASCENS, IGE - SEQUOIA: <0.1 KU/L
ALLERGEN: FUSARIUM PROLIFERATUM, IGE - SEQUOIA: <0.1 KU/L
ALLERGEN: HICKORY AND PECAN TREE, IGE - SEQUOIA: <0.1 KU/L
ALLERGEN: JACK MACKERAL, IGE - SEQUOIA: <0.1 KU/L
ALLERGEN: JOHNSON GRASS, IGE - SEQUOIA: <0.1 KU/L
ALLERGEN: MEADOW GRASS (KENTUCKY), IGE - SEQUOIA: <0.1 KU/L
ALLERGEN: MUCOR RACEMOSUS, IGE - SEQUOIA: <0.1 KU/L
ALLERGEN: MUGWORT, IGE - SEQUOIA: <0.1 KU/L
ALLERGEN: OAK TREE, IGE - SEQUOIA: <0.1 KU/L
ALLERGEN: OYSTER, IGE - SEQUOIA: <0.1 KU/L
ALLERGEN: PENICILLIUM CHRYSOGENUM, IGE - SEQUOIA: <0.1 KU/L
ALLERGEN: PHOMA BETAE, IGE - SEQUOIA: 0.11 KU/L
ALLERGEN: RAGWEED, GIANT, IGE - SEQUOIA: <0.1 KU/L
ALLERGEN: REDTOP GRASS, IGE - SEQUOIA: <0.1 KU/L
ALLERGEN: RUSSIAN THISTLE, IGE - SEQUOIA: <0.1 KU/L
ALLERGEN: RYE GRASS, IGE - SEQUOIA: <0.1 KU/L
ALLERGEN: SALMON, IGE - SEQUOIA: <0.1 KU/L
ALLERGEN: SETOMELANOMMA ROSTRATA, IGE - SEQUOIA: <0.1 KU/L
ALLERGEN: SHORT RAGWEED, IGE - SEQUOIA: <0.1 KU/L
ALLERGEN: SYCAMORE TREE, IGE - SEQUOIA: <0.1 KU/L
ALLERGEN: TIMOTHY GRASS, IGE - SEQUOIA: <0.1 KU/L
ALLERGEN: TUNA, IGE - SEQUOIA: <0.1 KU/L
ALLERGEN: WALNUT TREE, IGE - SEQUOIA: <0.1 KU/L
ALLERGEN: WHITE ASH TREE, IGE - SEQUOIA: <0.1 KU/L
ALLERGEN: WILLOW TREE, IGE - SEQUOIA: <0.1 KU/L

## 2024-03-27 LAB
C TETANI TOXOID IGG SERPL IA-ACNC: 0.1 IU/ML
S PN DA SERO 19F IGG SER-MCNC: 4.3 UG/ML
S PNEUM DA 1 IGG SER-MCNC: 0.79 UG/ML
S PNEUM DA 12F IGG SER-MCNC: 3.16 UG/ML
S PNEUM DA 14 IGG SER-MCNC: 1.73 UG/ML
S PNEUM DA 18C IGG SER-MCNC: 1.03 UG/ML
S PNEUM DA 23F IGG SER-MCNC: 2.09 UG/ML
S PNEUM DA 3 IGG SER-MCNC: 0.71 UG/ML
S PNEUM DA 4 IGG SER-MCNC: 6.46 UG/ML
S PNEUM DA 5 IGG SER-MCNC: >21.21 UG/ML
S PNEUM DA 6B IGG SER-MCNC: 0.47 UG/ML
S PNEUM DA 7F IGG SER-MCNC: 20.78 UG/ML
S PNEUM DA 8 IGG SER-MCNC: 6.06 UG/ML
S PNEUM DA 9N IGG SER-MCNC: 4.84 UG/ML
S PNEUM DA 9V IGG SER-MCNC: 2.9 UG/ML
S PNEUM SEROTYPE IGG SER-IMP: NORMAL

## 2024-03-28 LAB
A FUMIGATUS1 AB SER QL ID: NORMAL
A FUMIGATUS6 AB SER QL ID: NORMAL
A PULLULANS AB SER QL ID: NORMAL
PIGEON SERUM AB QL ID: NORMAL
S RECTIVIRGULA AB SER QL ID: NORMAL

## 2024-04-01 ENCOUNTER — TELEPHONE (OUTPATIENT)
Dept: ALLERGY | Age: 64
End: 2024-04-01

## 2024-04-01 ENCOUNTER — E-ADVICE (OUTPATIENT)
Dept: ALLERGY | Age: 64
End: 2024-04-01

## 2024-04-03 ENCOUNTER — TELEPHONE (OUTPATIENT)
Dept: FAMILY MEDICINE | Age: 64
End: 2024-04-03

## 2024-04-04 ENCOUNTER — E-ADVICE (OUTPATIENT)
Dept: ALLERGY | Age: 64
End: 2024-04-04

## 2024-04-10 ENCOUNTER — APPOINTMENT (OUTPATIENT)
Dept: FAMILY MEDICINE | Age: 64
End: 2024-04-10

## 2024-04-10 ENCOUNTER — APPOINTMENT (OUTPATIENT)
Dept: GASTROENTEROLOGY | Age: 64
End: 2024-04-10
Attending: INTERNAL MEDICINE

## 2024-04-10 VITALS
DIASTOLIC BLOOD PRESSURE: 84 MMHG | SYSTOLIC BLOOD PRESSURE: 132 MMHG | RESPIRATION RATE: 16 BRPM | BODY MASS INDEX: 27.97 KG/M2 | HEIGHT: 62 IN | HEART RATE: 83 BPM | WEIGHT: 152 LBS | TEMPERATURE: 97.5 F

## 2024-04-10 DIAGNOSIS — E11.9 TYPE 2 DIABETES MELLITUS WITHOUT COMPLICATION, WITHOUT LONG-TERM CURRENT USE OF INSULIN (CMD): ICD-10-CM

## 2024-04-10 DIAGNOSIS — E78.5 HYPERLIPIDEMIA ASSOCIATED WITH TYPE 2 DIABETES MELLITUS (CMD): ICD-10-CM

## 2024-04-10 DIAGNOSIS — H91.93 BILATERAL HEARING LOSS, UNSPECIFIED HEARING LOSS TYPE: ICD-10-CM

## 2024-04-10 DIAGNOSIS — L72.0 CYST OF SKIN AND SUBCUTANEOUS TISSUE: ICD-10-CM

## 2024-04-10 DIAGNOSIS — E11.69 HYPERLIPIDEMIA ASSOCIATED WITH TYPE 2 DIABETES MELLITUS (CMD): ICD-10-CM

## 2024-04-10 DIAGNOSIS — Z23 NEED FOR TDAP VACCINATION: ICD-10-CM

## 2024-04-10 DIAGNOSIS — Z00.01 ENCOUNTER FOR GENERAL ADULT MEDICAL EXAMINATION WITH ABNORMAL FINDINGS: Primary | ICD-10-CM

## 2024-04-10 DIAGNOSIS — M25.50 ARTHRALGIA, UNSPECIFIED JOINT: ICD-10-CM

## 2024-04-10 DIAGNOSIS — J45.40 MODERATE PERSISTENT ASTHMA WITHOUT COMPLICATION: Chronic | ICD-10-CM

## 2024-04-10 DIAGNOSIS — E55.9 VITAMIN D DEFICIENCY: ICD-10-CM

## 2024-04-10 DIAGNOSIS — L65.9 HAIR LOSS: ICD-10-CM

## 2024-04-10 RX ORDER — LANCING DEVICE
EACH MISCELLANEOUS
Qty: 100 EACH | Refills: 1 | Status: SHIPPED | OUTPATIENT
Start: 2024-04-10

## 2024-04-10 RX ORDER — METFORMIN HYDROCHLORIDE 500 MG/1
500 TABLET, EXTENDED RELEASE ORAL
Qty: 90 TABLET | Refills: 3 | Status: SHIPPED | OUTPATIENT
Start: 2024-04-10

## 2024-04-10 RX ORDER — LANCING DEVICE
EACH MISCELLANEOUS
Qty: 1 KIT | Refills: 0 | Status: SHIPPED | OUTPATIENT
Start: 2024-04-10

## 2024-04-10 RX ORDER — ATORVASTATIN CALCIUM 10 MG/1
10 TABLET, FILM COATED ORAL DAILY
Qty: 90 TABLET | Refills: 3 | Status: SHIPPED | OUTPATIENT
Start: 2024-04-10

## 2024-04-10 RX ORDER — LANCING DEVICE
EACH MISCELLANEOUS
Qty: 1 EACH | Refills: 0 | Status: SHIPPED | OUTPATIENT
Start: 2024-04-10

## 2024-04-10 ASSESSMENT — PATIENT HEALTH QUESTIONNAIRE - PHQ9
2. FEELING DOWN, DEPRESSED OR HOPELESS: NOT AT ALL
SUM OF ALL RESPONSES TO PHQ9 QUESTIONS 1 AND 2: 0
1. LITTLE INTEREST OR PLEASURE IN DOING THINGS: NOT AT ALL
CLINICAL INTERPRETATION OF PHQ2 SCORE: NO FURTHER SCREENING NEEDED
SUM OF ALL RESPONSES TO PHQ9 QUESTIONS 1 AND 2: 0

## 2024-04-26 ENCOUNTER — APPOINTMENT (OUTPATIENT)
Dept: AUDIOLOGY | Age: 64
End: 2024-04-26
Attending: FAMILY MEDICINE

## 2024-04-26 DIAGNOSIS — H90.3 SENSORINEURAL HEARING LOSS (SNHL) OF BOTH EARS: Primary | ICD-10-CM

## 2024-05-08 RX ORDER — PANTOPRAZOLE SODIUM 40 MG/1
40 TABLET, DELAYED RELEASE ORAL 2 TIMES DAILY
Qty: 180 TABLET | Refills: 0 | Status: SHIPPED | OUTPATIENT
Start: 2024-05-08 | End: 2025-05-08

## 2024-05-29 RX ORDER — SUCRALFATE 1 G/1
1 TABLET ORAL 4 TIMES DAILY
Qty: 120 TABLET | Refills: 3 | OUTPATIENT
Start: 2024-05-29

## 2024-05-29 RX ORDER — SUCRALFATE 1 G/1
1 TABLET ORAL 4 TIMES DAILY
Qty: 120 TABLET | Refills: 3 | Status: SHIPPED | OUTPATIENT
Start: 2024-05-29 | End: 2024-09-26

## 2024-06-02 ENCOUNTER — E-ADVICE (OUTPATIENT)
Dept: OPTOMETRY | Age: 64
End: 2024-06-02

## 2024-06-13 ENCOUNTER — APPOINTMENT (OUTPATIENT)
Dept: DERMATOLOGY | Age: 64
End: 2024-06-13

## 2024-07-08 ENCOUNTER — LAB SERVICES (OUTPATIENT)
Dept: LAB | Age: 64
End: 2024-07-08

## 2024-07-08 DIAGNOSIS — E11.69 HYPERLIPIDEMIA ASSOCIATED WITH TYPE 2 DIABETES MELLITUS  (CMD): ICD-10-CM

## 2024-07-08 DIAGNOSIS — L65.9 HAIR LOSS: ICD-10-CM

## 2024-07-08 DIAGNOSIS — E11.9 TYPE 2 DIABETES MELLITUS WITHOUT COMPLICATION, WITHOUT LONG-TERM CURRENT USE OF INSULIN  (CMD): ICD-10-CM

## 2024-07-08 DIAGNOSIS — E55.9 VITAMIN D DEFICIENCY: ICD-10-CM

## 2024-07-08 DIAGNOSIS — E78.5 HYPERLIPIDEMIA ASSOCIATED WITH TYPE 2 DIABETES MELLITUS  (CMD): ICD-10-CM

## 2024-07-08 DIAGNOSIS — J45.40 MODERATE PERSISTENT ASTHMA WITHOUT COMPLICATION (CMD): ICD-10-CM

## 2024-07-08 DIAGNOSIS — M25.50 ARTHRALGIA, UNSPECIFIED JOINT: ICD-10-CM

## 2024-07-08 LAB
25(OH)D3+25(OH)D2 SERPL-MCNC: 40.7 NG/ML (ref 30–100)
ALBUMIN SERPL-MCNC: 3.8 G/DL (ref 3.6–5.1)
ALBUMIN/GLOB SERPL: 1.2 {RATIO} (ref 1–2.4)
ALP SERPL-CCNC: 90 UNITS/L (ref 45–117)
ALT SERPL-CCNC: 33 UNITS/L
ANION GAP SERPL CALC-SCNC: 14 MMOL/L (ref 7–19)
AST SERPL-CCNC: 24 UNITS/L
BASOPHILS # BLD: 0 K/MCL (ref 0–0.3)
BASOPHILS NFR BLD: 0 %
BILIRUB SERPL-MCNC: 0.7 MG/DL (ref 0.2–1)
BUN SERPL-MCNC: 11 MG/DL (ref 6–20)
BUN/CREAT SERPL: 13 (ref 7–25)
CALCIUM SERPL-MCNC: 9 MG/DL (ref 8.4–10.2)
CHLORIDE SERPL-SCNC: 102 MMOL/L (ref 97–110)
CHOLEST SERPL-MCNC: 277 MG/DL
CHOLEST/HDLC SERPL: 3.7 {RATIO}
CO2 SERPL-SCNC: 29 MMOL/L (ref 21–32)
CREAT SERPL-MCNC: 0.83 MG/DL (ref 0.51–0.95)
DEPRECATED RDW RBC: 37.3 FL (ref 39–50)
EGFRCR SERPLBLD CKD-EPI 2021: 79 ML/MIN/{1.73_M2}
EOSINOPHIL # BLD: 0.2 K/MCL (ref 0–0.5)
EOSINOPHIL NFR BLD: 4 %
ERYTHROCYTE [DISTWIDTH] IN BLOOD: 14.6 % (ref 11–15)
ERYTHROCYTE [SEDIMENTATION RATE] IN BLOOD BY WESTERGREN METHOD: 38 MM/HR (ref 0–20)
FASTING DURATION TIME PATIENT: 10 HOURS (ref 0–999)
GLOBULIN SER-MCNC: 3.3 G/DL (ref 2–4)
GLUCOSE SERPL-MCNC: 88 MG/DL (ref 70–99)
HBA1C MFR BLD: 6.2 % (ref 4.5–5.6)
HCT VFR BLD CALC: 39.3 % (ref 36–46.5)
HDLC SERPL-MCNC: 75 MG/DL
HGB BLD-MCNC: 11.7 G/DL (ref 12–15.5)
IMM GRANULOCYTES # BLD AUTO: 0 K/MCL (ref 0–0.2)
IMM GRANULOCYTES # BLD: 0 %
LDLC SERPL CALC-MCNC: 158 MG/DL
LYMPHOCYTES # BLD: 2.6 K/MCL (ref 1–4)
LYMPHOCYTES NFR BLD: 37 %
MCH RBC QN AUTO: 21.5 PG (ref 26–34)
MCHC RBC AUTO-ENTMCNC: 29.8 G/DL (ref 32–36.5)
MCV RBC AUTO: 72.4 FL (ref 78–100)
MONOCYTES # BLD: 0.5 K/MCL (ref 0.3–0.9)
MONOCYTES NFR BLD: 7 %
NEUTROPHILS # BLD: 3.5 K/MCL (ref 1.8–7.7)
NEUTROPHILS NFR BLD: 52 %
NONHDLC SERPL-MCNC: 202 MG/DL
NRBC BLD MANUAL-RTO: 0 /100 WBC
PLATELET # BLD AUTO: 320 K/MCL (ref 140–450)
POTASSIUM SERPL-SCNC: 3.9 MMOL/L (ref 3.4–5.1)
PROT SERPL-MCNC: 7.1 G/DL (ref 6.4–8.2)
RBC # BLD: 5.43 MIL/MCL (ref 4–5.2)
RHEUMATOID FACT SER NEPH-ACNC: <10 UNITS/ML
SODIUM SERPL-SCNC: 141 MMOL/L (ref 135–145)
TRIGL SERPL-MCNC: 219 MG/DL
TSH SERPL-ACNC: 3.48 MCUNITS/ML (ref 0.35–5)
WBC # BLD: 6.8 K/MCL (ref 4.2–11)

## 2024-07-08 PROCEDURE — 80061 LIPID PANEL: CPT | Performed by: INTERNAL MEDICINE

## 2024-07-08 PROCEDURE — 82306 VITAMIN D 25 HYDROXY: CPT | Performed by: INTERNAL MEDICINE

## 2024-07-08 PROCEDURE — 85025 COMPLETE CBC W/AUTO DIFF WBC: CPT | Performed by: INTERNAL MEDICINE

## 2024-07-08 PROCEDURE — 86431 RHEUMATOID FACTOR QUANT: CPT | Performed by: CLINICAL MEDICAL LABORATORY

## 2024-07-08 PROCEDURE — 84443 ASSAY THYROID STIM HORMONE: CPT | Performed by: INTERNAL MEDICINE

## 2024-07-08 PROCEDURE — 85652 RBC SED RATE AUTOMATED: CPT | Performed by: INTERNAL MEDICINE

## 2024-07-08 PROCEDURE — 80053 COMPREHEN METABOLIC PANEL: CPT | Performed by: INTERNAL MEDICINE

## 2024-07-08 PROCEDURE — 83036 HEMOGLOBIN GLYCOSYLATED A1C: CPT | Performed by: INTERNAL MEDICINE

## 2024-07-08 PROCEDURE — 36415 COLL VENOUS BLD VENIPUNCTURE: CPT | Performed by: FAMILY MEDICINE

## 2024-07-09 ENCOUNTER — ANESTHESIA EVENT (OUTPATIENT)
Dept: GASTROENTEROLOGY | Age: 64
End: 2024-07-09

## 2024-07-10 ENCOUNTER — ANESTHESIA (OUTPATIENT)
Dept: GASTROENTEROLOGY | Age: 64
End: 2024-07-10

## 2024-07-10 ENCOUNTER — E-ADVICE (OUTPATIENT)
Dept: FAMILY MEDICINE | Age: 64
End: 2024-07-10

## 2024-07-10 ENCOUNTER — APPOINTMENT (OUTPATIENT)
Dept: GASTROENTEROLOGY | Age: 64
End: 2024-07-10
Attending: INTERNAL MEDICINE

## 2024-07-10 ENCOUNTER — TELEPHONE (OUTPATIENT)
Dept: OTOLARYNGOLOGY | Age: 64
End: 2024-07-10

## 2024-07-10 VITALS
SYSTOLIC BLOOD PRESSURE: 126 MMHG | TEMPERATURE: 97 F | RESPIRATION RATE: 18 BRPM | HEART RATE: 78 BPM | DIASTOLIC BLOOD PRESSURE: 68 MMHG | OXYGEN SATURATION: 97 % | BODY MASS INDEX: 26.4 KG/M2 | WEIGHT: 149 LBS | HEIGHT: 63 IN

## 2024-07-10 DIAGNOSIS — K64.9 HEMORRHOIDS, UNSPECIFIED HEMORRHOID TYPE: ICD-10-CM

## 2024-07-10 DIAGNOSIS — K63.5 POLYP OF COLON, UNSPECIFIED PART OF COLON, UNSPECIFIED TYPE: ICD-10-CM

## 2024-07-10 DIAGNOSIS — R10.13 EPIGASTRIC PAIN: ICD-10-CM

## 2024-07-10 DIAGNOSIS — Z86.010 HX OF COLONIC POLYPS: ICD-10-CM

## 2024-07-10 DIAGNOSIS — K29.70 GASTRITIS WITHOUT BLEEDING, UNSPECIFIED CHRONICITY, UNSPECIFIED GASTRITIS TYPE: ICD-10-CM

## 2024-07-10 DIAGNOSIS — R05.3 CHRONIC COUGH: ICD-10-CM

## 2024-07-10 DIAGNOSIS — K20.90 ESOPHAGITIS: ICD-10-CM

## 2024-07-10 DIAGNOSIS — K57.90 DIVERTICULOSIS: ICD-10-CM

## 2024-07-10 LAB
FASTING STATUS PATIENT QL REPORTED: YES
GLUCOSE BLD-MCNC: 127 MG/DL (ref 65–99)

## 2024-07-10 PROCEDURE — 45385 COLONOSCOPY W/LESION REMOVAL: CPT | Performed by: INTERNAL MEDICINE

## 2024-07-10 PROCEDURE — 43239 EGD BIOPSY SINGLE/MULTIPLE: CPT | Performed by: INTERNAL MEDICINE

## 2024-07-10 RX ORDER — LIDOCAINE HYDROCHLORIDE 10 MG/ML
INJECTION, SOLUTION INFILTRATION; PERINEURAL PRN
Status: DISCONTINUED | OUTPATIENT
Start: 2024-07-10 | End: 2024-07-10

## 2024-07-10 RX ORDER — DEXTROSE MONOHYDRATE 50 MG/ML
INJECTION, SOLUTION INTRAVENOUS CONTINUOUS PRN
Status: DISCONTINUED | OUTPATIENT
Start: 2024-07-10 | End: 2024-07-12 | Stop reason: HOSPADM

## 2024-07-10 RX ORDER — PROPOFOL 10 MG/ML
INJECTION, EMULSION INTRAVENOUS PRN
Status: DISCONTINUED | OUTPATIENT
Start: 2024-07-10 | End: 2024-07-10

## 2024-07-10 RX ORDER — DEXTROSE MONOHYDRATE 25 G/50ML
25 INJECTION, SOLUTION INTRAVENOUS PRN
Status: DISCONTINUED | OUTPATIENT
Start: 2024-07-10 | End: 2024-07-12 | Stop reason: HOSPADM

## 2024-07-10 RX ORDER — LIDOCAINE HYDROCHLORIDE 10 MG/ML
5 INJECTION, SOLUTION INFILTRATION; PERINEURAL PRN
Status: DISCONTINUED | OUTPATIENT
Start: 2024-07-10 | End: 2024-07-12 | Stop reason: HOSPADM

## 2024-07-10 RX ORDER — NICOTINE POLACRILEX 4 MG
30 LOZENGE BUCCAL
Status: DISCONTINUED | OUTPATIENT
Start: 2024-07-10 | End: 2024-07-12 | Stop reason: HOSPADM

## 2024-07-10 RX ORDER — SODIUM CHLORIDE 9 MG/ML
INJECTION, SOLUTION INTRAVENOUS CONTINUOUS
Status: DISCONTINUED | OUTPATIENT
Start: 2024-07-10 | End: 2024-07-12 | Stop reason: HOSPADM

## 2024-07-10 RX ORDER — SODIUM CHLORIDE, SODIUM LACTATE, POTASSIUM CHLORIDE, CALCIUM CHLORIDE 600; 310; 30; 20 MG/100ML; MG/100ML; MG/100ML; MG/100ML
INJECTION, SOLUTION INTRAVENOUS CONTINUOUS
Status: DISCONTINUED | OUTPATIENT
Start: 2024-07-10 | End: 2024-07-12 | Stop reason: HOSPADM

## 2024-07-10 RX ADMIN — PROPOFOL 100 MG: 10 INJECTION, EMULSION INTRAVENOUS at 07:24

## 2024-07-10 RX ADMIN — PROPOFOL 100 MG: 10 INJECTION, EMULSION INTRAVENOUS at 07:45

## 2024-07-10 RX ADMIN — LIDOCAINE HYDROCHLORIDE 50 MG: 10 INJECTION, SOLUTION INFILTRATION; PERINEURAL at 07:24

## 2024-07-10 RX ADMIN — PROPOFOL 100 MG: 10 INJECTION, EMULSION INTRAVENOUS at 07:33

## 2024-07-10 RX ADMIN — SODIUM CHLORIDE, SODIUM LACTATE, POTASSIUM CHLORIDE, CALCIUM CHLORIDE: 600; 310; 30; 20 INJECTION, SOLUTION INTRAVENOUS at 07:02

## 2024-07-10 RX ADMIN — PROPOFOL 100 MG: 10 INJECTION, EMULSION INTRAVENOUS at 07:39

## 2024-07-10 ASSESSMENT — PAIN SCALES - GENERAL
PAINLEVEL_OUTOF10: 0

## 2024-07-10 ASSESSMENT — ENCOUNTER SYMPTOMS: EXERCISE TOLERANCE: GOOD (>4 METS)

## 2024-07-10 ASSESSMENT — ACTIVITIES OF DAILY LIVING (ADL)
ADL_BEFORE_ADMISSION: INDEPENDENT
ADL_SCORE: 12

## 2024-07-12 LAB
ASR DISCLAIMER: NORMAL
CASE RPRT: NORMAL
CLINICAL INFO: NORMAL
PATH REPORT.FINAL DX SPEC: NORMAL
PATH REPORT.GROSS SPEC: NORMAL

## 2024-07-16 DIAGNOSIS — E11.9 TYPE 2 DIABETES MELLITUS WITHOUT COMPLICATION, WITHOUT LONG-TERM CURRENT USE OF INSULIN  (CMD): Primary | ICD-10-CM

## 2024-07-16 DIAGNOSIS — E78.5 HYPERLIPIDEMIA ASSOCIATED WITH TYPE 2 DIABETES MELLITUS  (CMD): ICD-10-CM

## 2024-07-16 DIAGNOSIS — E11.69 HYPERLIPIDEMIA ASSOCIATED WITH TYPE 2 DIABETES MELLITUS  (CMD): ICD-10-CM

## 2024-07-16 DIAGNOSIS — M25.50 ARTHRALGIA, UNSPECIFIED JOINT: ICD-10-CM

## 2024-07-22 ENCOUNTER — CLINICAL DOCUMENTATION (OUTPATIENT)
Dept: GASTROENTEROLOGY | Age: 64
End: 2024-07-22

## 2024-07-22 LAB — COLONOSCOPY STUDY: NORMAL

## 2024-08-13 DIAGNOSIS — J45.40 MODERATE PERSISTENT ASTHMA WITHOUT COMPLICATION (CMD): ICD-10-CM

## 2024-08-13 RX ORDER — MONTELUKAST SODIUM 10 MG/1
10 TABLET ORAL EVERY EVENING
Qty: 90 TABLET | Refills: 0 | Status: SHIPPED | OUTPATIENT
Start: 2024-08-13

## 2024-08-13 RX ORDER — PANTOPRAZOLE SODIUM 40 MG/1
40 TABLET, DELAYED RELEASE ORAL 2 TIMES DAILY
Qty: 180 TABLET | Refills: 3 | OUTPATIENT
Start: 2024-08-13

## 2024-08-14 ENCOUNTER — APPOINTMENT (OUTPATIENT)
Dept: DERMATOLOGY | Age: 64
End: 2024-08-14
Attending: FAMILY MEDICINE

## 2024-08-14 DIAGNOSIS — L98.8 RHYTIDES: ICD-10-CM

## 2024-08-14 DIAGNOSIS — D48.5 NEOPLASM OF UNCERTAIN BEHAVIOR OF SKIN: Primary | ICD-10-CM

## 2024-08-16 ENCOUNTER — TELEPHONE (OUTPATIENT)
Dept: GASTROENTEROLOGY | Age: 64
End: 2024-08-16

## 2024-08-28 ENCOUNTER — APPOINTMENT (OUTPATIENT)
Dept: DERMATOLOGY | Age: 64
End: 2024-08-28

## 2024-08-28 DIAGNOSIS — B99.9 INFECTION: Primary | ICD-10-CM

## 2024-08-28 PROCEDURE — 99024 POSTOP FOLLOW-UP VISIT: CPT | Performed by: DERMATOLOGY

## 2024-08-28 RX ORDER — DOXYCYCLINE HYCLATE 100 MG
100 TABLET ORAL 2 TIMES DAILY
Qty: 28 TABLET | Refills: 0 | Status: SHIPPED | OUTPATIENT
Start: 2024-08-28 | End: 2024-09-11

## 2024-08-28 RX ORDER — MUPIROCIN 20 MG/G
1 OINTMENT TOPICAL 3 TIMES DAILY
Qty: 22 G | Refills: 0 | Status: SHIPPED | OUTPATIENT
Start: 2024-08-28

## 2024-08-30 LAB
BACTERIA SPEC AEROBE CULT: ABNORMAL
BACTERIA SPEC AEROBE CULT: ABNORMAL
GRAM STN SPEC: ABNORMAL

## 2024-09-04 RX ORDER — CIPROFLOXACIN 500 MG/1
500 TABLET, FILM COATED ORAL 2 TIMES DAILY
Qty: 20 TABLET | Refills: 0 | Status: SHIPPED | OUTPATIENT
Start: 2024-09-04 | End: 2024-09-14

## 2024-10-08 ENCOUNTER — E-ADVICE (OUTPATIENT)
Dept: FAMILY MEDICINE | Age: 64
End: 2024-10-08

## 2024-10-09 ENCOUNTER — E-ADVICE (OUTPATIENT)
Dept: GASTROENTEROLOGY | Age: 64
End: 2024-10-09

## 2024-10-09 ENCOUNTER — TELEPHONE (OUTPATIENT)
Dept: FAMILY MEDICINE | Age: 64
End: 2024-10-09

## 2024-10-09 DIAGNOSIS — R10.13 EPIGASTRIC PAIN: Primary | ICD-10-CM

## 2024-10-09 RX ORDER — NYSTATIN AND TRIAMCINOLONE ACETONIDE 100000; 1 [USP'U]/G; MG/G
1 CREAM TOPICAL 2 TIMES DAILY
Qty: 30 G | Refills: 2 | Status: SHIPPED | OUTPATIENT
Start: 2024-10-09

## 2024-10-10 RX ORDER — TRIAMCINOLONE ACETONIDE 1 MG/G
1 CREAM TOPICAL 2 TIMES DAILY
Qty: 30 G | Refills: 1 | Status: SHIPPED | OUTPATIENT
Start: 2024-10-10

## 2024-10-10 RX ORDER — NYSTATIN 100000 U/G
1 CREAM TOPICAL 2 TIMES DAILY
Qty: 30 G | Refills: 1 | Status: SHIPPED | OUTPATIENT
Start: 2024-10-10

## 2024-10-16 SDOH — ECONOMIC STABILITY: TRANSPORTATION INSECURITY
IN THE PAST 12 MONTHS, HAS LACK OF RELIABLE TRANSPORTATION KEPT YOU FROM MEDICAL APPOINTMENTS, MEETINGS, WORK OR FROM GETTING THINGS NEEDED FOR DAILY LIVING?: NO

## 2024-10-16 SDOH — ECONOMIC STABILITY: HOUSING INSECURITY: WHAT IS YOUR LIVING SITUATION TODAY?: I HAVE A STEADY PLACE TO LIVE

## 2024-10-16 SDOH — ECONOMIC STABILITY: HOUSING INSECURITY: DO YOU HAVE PROBLEMS WITH ANY OF THE FOLLOWING?: NONE OF THE ABOVE

## 2024-10-16 SDOH — ECONOMIC STABILITY: FOOD INSECURITY: WITHIN THE PAST 12 MONTHS, THE FOOD YOU BOUGHT JUST DIDN'T LAST AND YOU DIDN'T HAVE MONEY TO GET MORE.: NEVER TRUE

## 2024-10-16 SDOH — ECONOMIC STABILITY: GENERAL: WOULD YOU LIKE HELP WITH ANY OF THE FOLLOWING NEEDS?: I DON'T WANT HELP WITH ANY OF THESE

## 2024-10-16 ASSESSMENT — SOCIAL DETERMINANTS OF HEALTH (SDOH): IN THE PAST 12 MONTHS, HAS THE ELECTRIC, GAS, OIL, OR WATER COMPANY THREATENED TO SHUT OFF SERVICE IN YOUR HOME?: NO

## 2024-10-18 ENCOUNTER — APPOINTMENT (OUTPATIENT)
Dept: FAMILY MEDICINE | Age: 64
End: 2024-10-18

## 2024-10-18 DIAGNOSIS — Z23 NEED FOR VACCINATION: Primary | ICD-10-CM

## 2024-10-18 PROCEDURE — 90656 IIV3 VACC NO PRSV 0.5 ML IM: CPT | Performed by: FAMILY MEDICINE

## 2024-10-18 PROCEDURE — 90471 IMMUNIZATION ADMIN: CPT | Performed by: FAMILY MEDICINE

## 2024-10-30 ENCOUNTER — LAB SERVICES (OUTPATIENT)
Dept: LAB | Age: 64
End: 2024-10-30

## 2024-10-30 ENCOUNTER — APPOINTMENT (OUTPATIENT)
Dept: GENERAL RADIOLOGY | Age: 64
End: 2024-10-30

## 2024-10-30 DIAGNOSIS — E11.69 HYPERLIPIDEMIA ASSOCIATED WITH TYPE 2 DIABETES MELLITUS  (CMD): ICD-10-CM

## 2024-10-30 DIAGNOSIS — E11.9 TYPE 2 DIABETES MELLITUS WITHOUT COMPLICATION, WITHOUT LONG-TERM CURRENT USE OF INSULIN  (CMD): Primary | ICD-10-CM

## 2024-10-30 DIAGNOSIS — E11.9 TYPE 2 DIABETES MELLITUS WITHOUT COMPLICATION, WITHOUT LONG-TERM CURRENT USE OF INSULIN  (CMD): ICD-10-CM

## 2024-10-30 DIAGNOSIS — M25.50 ARTHRALGIA, UNSPECIFIED JOINT: ICD-10-CM

## 2024-10-30 DIAGNOSIS — E78.5 HYPERLIPIDEMIA ASSOCIATED WITH TYPE 2 DIABETES MELLITUS  (CMD): ICD-10-CM

## 2024-10-30 LAB
ALBUMIN SERPL-MCNC: 3.6 G/DL (ref 3.4–5)
ALBUMIN/GLOB SERPL: 1.1 {RATIO} (ref 1–2.4)
ALP SERPL-CCNC: 101 UNITS/L (ref 45–117)
ALT SERPL-CCNC: 31 UNITS/L
ANION GAP SERPL CALC-SCNC: 12 MMOL/L (ref 7–19)
AST SERPL-CCNC: 15 UNITS/L
BILIRUB SERPL-MCNC: 0.5 MG/DL (ref 0.2–1)
BUN SERPL-MCNC: 12 MG/DL (ref 6–20)
BUN/CREAT SERPL: 16 (ref 7–25)
CALCIUM SERPL-MCNC: 9.1 MG/DL (ref 8.4–10.2)
CHLORIDE SERPL-SCNC: 104 MMOL/L (ref 97–110)
CHOLEST SERPL-MCNC: 186 MG/DL
CHOLEST/HDLC SERPL: 2.4 {RATIO}
CO2 SERPL-SCNC: 30 MMOL/L (ref 21–32)
CREAT SERPL-MCNC: 0.74 MG/DL (ref 0.51–0.95)
EGFRCR SERPLBLD CKD-EPI 2021: 90 ML/MIN/{1.73_M2}
ERYTHROCYTE [SEDIMENTATION RATE] IN BLOOD BY WESTERGREN METHOD: 53 MM/HR (ref 0–20)
FASTING DURATION TIME PATIENT: ABNORMAL H
GLOBULIN SER-MCNC: 3.3 G/DL (ref 2–4)
GLUCOSE SERPL-MCNC: 111 MG/DL (ref 70–99)
HBA1C MFR BLD: 6.4 % (ref 4.5–5.6)
HDLC SERPL-MCNC: 78 MG/DL
LDLC SERPL CALC-MCNC: 67 MG/DL
NONHDLC SERPL-MCNC: 108 MG/DL
POTASSIUM SERPL-SCNC: 3.9 MMOL/L (ref 3.4–5.1)
PROT SERPL-MCNC: 6.9 G/DL (ref 6.4–8.2)
SODIUM SERPL-SCNC: 142 MMOL/L (ref 135–145)
TRIGL SERPL-MCNC: 205 MG/DL

## 2024-10-30 PROCEDURE — 85652 RBC SED RATE AUTOMATED: CPT | Performed by: INTERNAL MEDICINE

## 2024-10-30 PROCEDURE — 36415 COLL VENOUS BLD VENIPUNCTURE: CPT | Performed by: FAMILY MEDICINE

## 2024-10-30 PROCEDURE — 83036 HEMOGLOBIN GLYCOSYLATED A1C: CPT | Performed by: INTERNAL MEDICINE

## 2024-10-30 PROCEDURE — 80061 LIPID PANEL: CPT | Performed by: INTERNAL MEDICINE

## 2024-10-30 PROCEDURE — 80053 COMPREHEN METABOLIC PANEL: CPT | Performed by: INTERNAL MEDICINE

## 2024-11-11 ENCOUNTER — IMAGING SERVICES (OUTPATIENT)
Dept: GENERAL RADIOLOGY | Age: 64
End: 2024-11-11

## 2024-11-11 ENCOUNTER — APPOINTMENT (OUTPATIENT)
Dept: GENERAL RADIOLOGY | Age: 64
End: 2024-11-11

## 2024-11-11 PROCEDURE — A9537 TC99M MEBROFENIN: HCPCS | Performed by: RADIOLOGY

## 2024-11-11 PROCEDURE — 78226 HEPATOBILIARY SYSTEM IMAGING: CPT | Performed by: RADIOLOGY

## 2024-11-11 RX ORDER — KIT FOR THE PREPARATION OF TECHNETIUM TC 99M MEBROFENIN 45 MG/10ML
5 INJECTION, POWDER, LYOPHILIZED, FOR SOLUTION INTRAVENOUS ONCE
Status: COMPLETED | OUTPATIENT
Start: 2024-11-11 | End: 2024-11-11

## 2024-11-11 RX ADMIN — KIT FOR THE PREPARATION OF TECHNETIUM TC 99M MEBROFENIN 5.1 MILLICURIE: 45 INJECTION, POWDER, LYOPHILIZED, FOR SOLUTION INTRAVENOUS at 09:15

## 2024-11-18 DIAGNOSIS — J45.40 MODERATE PERSISTENT ASTHMA WITHOUT COMPLICATION (CMD): ICD-10-CM

## 2024-11-19 RX ORDER — MONTELUKAST SODIUM 10 MG/1
10 TABLET ORAL EVERY EVENING
Qty: 90 TABLET | Refills: 0 | Status: SHIPPED | OUTPATIENT
Start: 2024-11-19

## 2024-11-22 ENCOUNTER — APPOINTMENT (OUTPATIENT)
Dept: GASTROENTEROLOGY | Age: 64
End: 2024-11-22

## 2024-11-22 ENCOUNTER — TELEPHONE (OUTPATIENT)
Dept: GASTROENTEROLOGY | Age: 64
End: 2024-11-22

## 2024-11-22 VITALS — WEIGHT: 159.8 LBS | HEIGHT: 62 IN | BODY MASS INDEX: 29.4 KG/M2

## 2024-11-22 DIAGNOSIS — K21.9 GERD WITHOUT ESOPHAGITIS: Primary | ICD-10-CM

## 2024-11-22 DIAGNOSIS — Z80.0 FAMILY HISTORY OF CHOLANGIOCARCINOMA: ICD-10-CM

## 2024-11-22 RX ORDER — DEXLANSOPRAZOLE 60 MG/1
60 CAPSULE, DELAYED RELEASE ORAL DAILY
Qty: 90 CAPSULE | Refills: 3 | Status: SHIPPED | OUTPATIENT
Start: 2024-11-22 | End: 2025-11-22

## 2024-11-22 ASSESSMENT — ENCOUNTER SYMPTOMS
CHILLS: 0
APPETITE CHANGE: 0
ANAL BLEEDING: 0
NAUSEA: 0
BLOOD IN STOOL: 0
LIGHT-HEADEDNESS: 0
VOICE CHANGE: 0
FEVER: 0
VOMITING: 0
TROUBLE SWALLOWING: 0
ABDOMINAL PAIN: 1
COUGH: 0
ACTIVITY CHANGE: 0
DIARRHEA: 0
UNEXPECTED WEIGHT CHANGE: 0
FATIGUE: 0
DIAPHORESIS: 0
SHORTNESS OF BREATH: 1
CONSTIPATION: 0
COLOR CHANGE: 0
ABDOMINAL DISTENTION: 0
DIZZINESS: 0
CONFUSION: 0
WEAKNESS: 0

## 2024-12-03 ENCOUNTER — E-ADVICE (OUTPATIENT)
Dept: PULMONOLOGY | Age: 64
End: 2024-12-03

## 2024-12-03 ENCOUNTER — TELEPHONE (OUTPATIENT)
Dept: GASTROENTEROLOGY | Age: 64
End: 2024-12-03

## 2024-12-03 DIAGNOSIS — K21.9 GERD WITHOUT ESOPHAGITIS: Primary | ICD-10-CM

## 2024-12-03 DIAGNOSIS — J45.40 MODERATE PERSISTENT ASTHMA WITHOUT COMPLICATION (CMD): ICD-10-CM

## 2024-12-04 RX ORDER — ALBUTEROL SULFATE 90 UG/1
2 INHALANT RESPIRATORY (INHALATION) EVERY 4 HOURS PRN
Qty: 2 EACH | Refills: 0 | Status: SHIPPED | OUTPATIENT
Start: 2024-12-04

## 2025-01-13 ENCOUNTER — E-ADVICE (OUTPATIENT)
Dept: AUDIOLOGY | Age: 65
End: 2025-01-13

## 2025-01-13 ENCOUNTER — E-ADVICE (OUTPATIENT)
Dept: GASTROENTEROLOGY | Age: 65
End: 2025-01-13

## 2025-01-14 ENCOUNTER — E-ADVICE (OUTPATIENT)
Dept: FAMILY MEDICINE | Age: 65
End: 2025-01-14

## 2025-01-18 ENCOUNTER — LAB SERVICES (OUTPATIENT)
Dept: LAB | Age: 65
End: 2025-01-18

## 2025-01-18 DIAGNOSIS — E11.9 TYPE 2 DIABETES MELLITUS WITHOUT COMPLICATION, WITHOUT LONG-TERM CURRENT USE OF INSULIN  (CMD): ICD-10-CM

## 2025-01-18 DIAGNOSIS — M25.50 ARTHRALGIA, UNSPECIFIED JOINT: ICD-10-CM

## 2025-01-18 DIAGNOSIS — E11.69 HYPERLIPIDEMIA ASSOCIATED WITH TYPE 2 DIABETES MELLITUS  (CMD): ICD-10-CM

## 2025-01-18 DIAGNOSIS — E78.5 HYPERLIPIDEMIA ASSOCIATED WITH TYPE 2 DIABETES MELLITUS  (CMD): ICD-10-CM

## 2025-01-18 LAB
ALBUMIN SERPL-MCNC: 3.9 G/DL (ref 3.4–5)
ALBUMIN/GLOB SERPL: 1.2 {RATIO} (ref 1–2.4)
ALP SERPL-CCNC: 122 UNITS/L (ref 45–117)
ALT SERPL-CCNC: 37 UNITS/L
ANION GAP SERPL CALC-SCNC: 15 MMOL/L (ref 7–19)
AST SERPL-CCNC: 23 UNITS/L
BILIRUB SERPL-MCNC: 0.6 MG/DL (ref 0.2–1)
BUN SERPL-MCNC: 13 MG/DL (ref 6–20)
BUN/CREAT SERPL: 16 (ref 7–25)
CALCIUM SERPL-MCNC: 9.5 MG/DL (ref 8.4–10.2)
CHLORIDE SERPL-SCNC: 103 MMOL/L (ref 97–110)
CHOLEST SERPL-MCNC: 183 MG/DL
CHOLEST/HDLC SERPL: 2.2 {RATIO}
CO2 SERPL-SCNC: 29 MMOL/L (ref 21–32)
CREAT SERPL-MCNC: 0.81 MG/DL (ref 0.51–0.95)
CREAT UR-MCNC: 247.1 MG/DL
EGFRCR SERPLBLD CKD-EPI 2021: 81 ML/MIN/{1.73_M2}
ERYTHROCYTE [SEDIMENTATION RATE] IN BLOOD BY WESTERGREN METHOD: 45 MM/HR (ref 0–20)
FASTING DURATION TIME PATIENT: ABNORMAL H
GLOBULIN SER-MCNC: 3.3 G/DL (ref 2–4)
GLUCOSE SERPL-MCNC: 117 MG/DL (ref 70–99)
HBA1C MFR BLD: 6.6 % (ref 4.5–5.6)
HDLC SERPL-MCNC: 82 MG/DL
LDLC SERPL CALC-MCNC: 74 MG/DL
MICROALBUMIN UR-MCNC: 1.25 MG/DL
MICROALBUMIN/CREAT UR: 5.1 MG/G
NONHDLC SERPL-MCNC: 101 MG/DL
POTASSIUM SERPL-SCNC: 4.6 MMOL/L (ref 3.4–5.1)
PROT SERPL-MCNC: 7.2 G/DL (ref 6.4–8.2)
SODIUM SERPL-SCNC: 142 MMOL/L (ref 135–145)
TRIGL SERPL-MCNC: 135 MG/DL

## 2025-01-18 PROCEDURE — 82043 UR ALBUMIN QUANTITATIVE: CPT | Performed by: INTERNAL MEDICINE

## 2025-01-18 PROCEDURE — 80053 COMPREHEN METABOLIC PANEL: CPT | Performed by: INTERNAL MEDICINE

## 2025-01-18 PROCEDURE — 36415 COLL VENOUS BLD VENIPUNCTURE: CPT | Performed by: FAMILY MEDICINE

## 2025-01-18 PROCEDURE — 83036 HEMOGLOBIN GLYCOSYLATED A1C: CPT | Performed by: INTERNAL MEDICINE

## 2025-01-18 PROCEDURE — 82570 ASSAY OF URINE CREATININE: CPT | Performed by: INTERNAL MEDICINE

## 2025-01-18 PROCEDURE — 85652 RBC SED RATE AUTOMATED: CPT | Performed by: INTERNAL MEDICINE

## 2025-01-18 PROCEDURE — 80061 LIPID PANEL: CPT | Performed by: INTERNAL MEDICINE

## 2025-01-21 ENCOUNTER — E-ADVICE (OUTPATIENT)
Dept: GASTROENTEROLOGY | Age: 65
End: 2025-01-21

## 2025-01-30 ENCOUNTER — TELEPHONE (OUTPATIENT)
Dept: GASTROENTEROLOGY | Age: 65
End: 2025-01-30

## 2025-01-31 ENCOUNTER — E-ADVICE (OUTPATIENT)
Dept: FAMILY MEDICINE | Age: 65
End: 2025-01-31

## 2025-01-31 ENCOUNTER — TELEPHONE (OUTPATIENT)
Dept: GASTROENTEROLOGY | Age: 65
End: 2025-01-31

## 2025-01-31 ENCOUNTER — E-ADVICE (OUTPATIENT)
Dept: OPTOMETRY | Age: 65
End: 2025-01-31

## 2025-02-05 ENCOUNTER — E-ADVICE (OUTPATIENT)
Dept: FAMILY MEDICINE | Age: 65
End: 2025-02-05

## 2025-02-13 ENCOUNTER — E-ADVICE (OUTPATIENT)
Dept: PULMONOLOGY | Age: 65
End: 2025-02-13

## 2025-02-13 DIAGNOSIS — J45.40 MODERATE PERSISTENT ASTHMA WITHOUT COMPLICATION (CMD): ICD-10-CM

## 2025-02-13 RX ORDER — FLUTICASONE FUROATE AND VILANTEROL 200; 25 UG/1; UG/1
1 POWDER RESPIRATORY (INHALATION) DAILY
Qty: 3 EACH | Refills: 3 | Status: SHIPPED | OUTPATIENT
Start: 2025-02-13

## 2025-04-16 PROBLEM — E11.69 HYPERLIPIDEMIA ASSOCIATED WITH TYPE 2 DIABETES MELLITUS  (CMD): Status: ACTIVE | Noted: 2024-04-10

## 2025-04-16 PROBLEM — E78.5 HYPERLIPIDEMIA ASSOCIATED WITH TYPE 2 DIABETES MELLITUS  (CMD): Status: ACTIVE | Noted: 2024-04-10

## 2025-04-17 ENCOUNTER — E-ADVICE (OUTPATIENT)
Dept: FAMILY MEDICINE | Age: 65
End: 2025-04-17

## 2025-04-17 ENCOUNTER — APPOINTMENT (OUTPATIENT)
Dept: FAMILY MEDICINE | Age: 65
End: 2025-04-17

## 2025-04-17 VITALS
BODY MASS INDEX: 28.89 KG/M2 | RESPIRATION RATE: 16 BRPM | DIASTOLIC BLOOD PRESSURE: 82 MMHG | TEMPERATURE: 96.6 F | HEIGHT: 62 IN | HEART RATE: 88 BPM | WEIGHT: 157 LBS | SYSTOLIC BLOOD PRESSURE: 124 MMHG

## 2025-04-17 DIAGNOSIS — E11.69 HYPERLIPIDEMIA ASSOCIATED WITH TYPE 2 DIABETES MELLITUS  (CMD): ICD-10-CM

## 2025-04-17 DIAGNOSIS — E55.9 VITAMIN D DEFICIENCY: ICD-10-CM

## 2025-04-17 DIAGNOSIS — E11.9 TYPE 2 DIABETES MELLITUS WITHOUT COMPLICATION, WITHOUT LONG-TERM CURRENT USE OF INSULIN (CMD): ICD-10-CM

## 2025-04-17 DIAGNOSIS — Z12.31 ENCOUNTER FOR SCREENING MAMMOGRAM FOR MALIGNANT NEOPLASM OF BREAST: ICD-10-CM

## 2025-04-17 DIAGNOSIS — J45.40 MODERATE PERSISTENT ASTHMA WITHOUT COMPLICATION (CMD): ICD-10-CM

## 2025-04-17 DIAGNOSIS — Z13.820 SCREENING FOR OSTEOPOROSIS: ICD-10-CM

## 2025-04-17 DIAGNOSIS — Z01.419 ROUTINE GYNECOLOGICAL EXAMINATION: Primary | ICD-10-CM

## 2025-04-17 DIAGNOSIS — E78.5 HYPERLIPIDEMIA ASSOCIATED WITH TYPE 2 DIABETES MELLITUS  (CMD): ICD-10-CM

## 2025-04-17 DIAGNOSIS — N39.46 MIXED STRESS AND URGE URINARY INCONTINENCE: ICD-10-CM

## 2025-04-17 DIAGNOSIS — R00.2 PALPITATION: ICD-10-CM

## 2025-04-17 PROBLEM — J47.9 BRONCHIECTASIS WITHOUT COMPLICATION  (CMD): Status: RESOLVED | Noted: 2024-03-22 | Resolved: 2025-04-17

## 2025-04-17 RX ORDER — NYSTATIN AND TRIAMCINOLONE ACETONIDE 100000; 1 [USP'U]/G; MG/G
1 CREAM TOPICAL 2 TIMES DAILY
Qty: 30 G | Refills: 2 | Status: SHIPPED | OUTPATIENT
Start: 2025-04-17

## 2025-04-17 RX ORDER — ATORVASTATIN CALCIUM 10 MG/1
10 TABLET, FILM COATED ORAL DAILY
Qty: 90 TABLET | Refills: 3 | Status: SHIPPED | OUTPATIENT
Start: 2025-04-17

## 2025-04-17 RX ORDER — SOLIFENACIN SUCCINATE 5 MG/1
5 TABLET, FILM COATED ORAL DAILY
Qty: 30 TABLET | Refills: 1 | Status: SHIPPED | OUTPATIENT
Start: 2025-04-17

## 2025-04-17 RX ORDER — TRIAMCINOLONE ACETONIDE 1 MG/G
1 CREAM TOPICAL 2 TIMES DAILY
Qty: 30 G | Refills: 2 | Status: SHIPPED | OUTPATIENT
Start: 2025-04-17

## 2025-04-17 RX ORDER — METFORMIN HYDROCHLORIDE 500 MG/1
500 TABLET, EXTENDED RELEASE ORAL
Qty: 90 TABLET | Refills: 3 | Status: SHIPPED | OUTPATIENT
Start: 2025-04-17

## 2025-04-17 RX ORDER — NYSTATIN 100000 U/G
1 CREAM TOPICAL 2 TIMES DAILY
Qty: 30 G | Refills: 2 | Status: SHIPPED | OUTPATIENT
Start: 2025-04-17

## 2025-04-17 ASSESSMENT — PATIENT HEALTH QUESTIONNAIRE - PHQ9
1. LITTLE INTEREST OR PLEASURE IN DOING THINGS: NOT AT ALL
2. FEELING DOWN, DEPRESSED OR HOPELESS: NOT AT ALL
CLINICAL INTERPRETATION OF PHQ2 SCORE: NO FURTHER SCREENING NEEDED
SUM OF ALL RESPONSES TO PHQ9 QUESTIONS 1 AND 2: 0
SUM OF ALL RESPONSES TO PHQ9 QUESTIONS 1 AND 2: 0

## 2025-04-21 ENCOUNTER — E-ADVICE (OUTPATIENT)
Dept: OTOLARYNGOLOGY | Age: 65
End: 2025-04-21

## 2025-04-21 ENCOUNTER — E-ADVICE (OUTPATIENT)
Dept: FAMILY MEDICINE | Age: 65
End: 2025-04-21

## 2025-04-21 DIAGNOSIS — E11.9 TYPE 2 DIABETES MELLITUS WITHOUT COMPLICATION, WITHOUT LONG-TERM CURRENT USE OF INSULIN (CMD): Primary | ICD-10-CM

## 2025-04-21 DIAGNOSIS — E04.1 THYROID NODULE: ICD-10-CM

## 2025-04-22 LAB
CASE RPRT: NORMAL
CYTOLOGY CVX/VAG STUDY: NORMAL
HPV16+18+45 E6+E7MRNA CVX NAA+PROBE: NEGATIVE
PAP EDUCATIONAL NOTE: NORMAL
SERVICE CMNT-IMP: NORMAL
STAT OF ADQ CVX/VAG CYTO-IMP: NORMAL

## 2025-04-28 ENCOUNTER — TELEPHONE (OUTPATIENT)
Dept: FAMILY MEDICINE | Age: 65
End: 2025-04-28

## 2025-04-29 ENCOUNTER — RESULTS FOLLOW-UP (OUTPATIENT)
Dept: FAMILY MEDICINE | Age: 65
End: 2025-04-29

## 2025-04-29 ENCOUNTER — LAB SERVICES (OUTPATIENT)
Dept: LAB | Age: 65
End: 2025-04-29

## 2025-04-29 DIAGNOSIS — E11.69 HYPERLIPIDEMIA ASSOCIATED WITH TYPE 2 DIABETES MELLITUS  (CMD): ICD-10-CM

## 2025-04-29 DIAGNOSIS — R92.30 INCONCLUSIVE MAMMOGRAPHY DUE TO DENSE BREASTS: ICD-10-CM

## 2025-04-29 DIAGNOSIS — E78.5 HYPERLIPIDEMIA ASSOCIATED WITH TYPE 2 DIABETES MELLITUS  (CMD): ICD-10-CM

## 2025-04-29 DIAGNOSIS — R00.2 PALPITATION: ICD-10-CM

## 2025-04-29 DIAGNOSIS — Z12.39 OTHER SCREENING BREAST EXAMINATION: ICD-10-CM

## 2025-04-29 DIAGNOSIS — E11.9 TYPE 2 DIABETES MELLITUS WITHOUT COMPLICATION, WITHOUT LONG-TERM CURRENT USE OF INSULIN (CMD): ICD-10-CM

## 2025-04-29 DIAGNOSIS — D64.9 MILD ANEMIA: ICD-10-CM

## 2025-04-29 DIAGNOSIS — N64.89 OTHER SPECIFIED DISORDERS OF BREAST: ICD-10-CM

## 2025-04-29 DIAGNOSIS — R92.2 INCONCLUSIVE MAMMOGRAPHY DUE TO DENSE BREASTS: ICD-10-CM

## 2025-04-29 DIAGNOSIS — E11.9 TYPE 2 DIABETES MELLITUS WITHOUT COMPLICATION, WITHOUT LONG-TERM CURRENT USE OF INSULIN (CMD): Primary | ICD-10-CM

## 2025-04-29 DIAGNOSIS — E55.9 VITAMIN D DEFICIENCY: ICD-10-CM

## 2025-04-29 LAB
25(OH)D3+25(OH)D2 SERPL-MCNC: 43.4 NG/ML (ref 30–100)
ALBUMIN SERPL-MCNC: 3.7 G/DL (ref 3.4–5)
ALBUMIN/GLOB SERPL: 1.2 {RATIO} (ref 1–2.4)
ALP SERPL-CCNC: 107 UNITS/L (ref 45–117)
ALT SERPL-CCNC: 36 UNITS/L
ANION GAP SERPL CALC-SCNC: 10 MMOL/L (ref 7–19)
AST SERPL-CCNC: 20 UNITS/L
BASOPHILS # BLD: 0.1 K/MCL (ref 0–0.3)
BASOPHILS NFR BLD: 1 %
BILIRUB SERPL-MCNC: 0.5 MG/DL (ref 0.2–1)
BUN SERPL-MCNC: 12 MG/DL (ref 6–20)
BUN/CREAT SERPL: 15 (ref 7–25)
CALCIUM SERPL-MCNC: 9.6 MG/DL (ref 8.4–10.2)
CHLORIDE SERPL-SCNC: 104 MMOL/L (ref 97–110)
CHOLEST SERPL-MCNC: 182 MG/DL
CHOLEST/HDLC SERPL: 2.4 {RATIO}
CO2 SERPL-SCNC: 32 MMOL/L (ref 21–32)
CREAT SERPL-MCNC: 0.82 MG/DL (ref 0.51–0.95)
DEPRECATED RDW RBC: 38.9 FL (ref 39–50)
EGFRCR SERPLBLD CKD-EPI 2021: 80 ML/MIN/{1.73_M2}
EOSINOPHIL # BLD: 0.2 K/MCL (ref 0–0.5)
EOSINOPHIL NFR BLD: 2 %
ERYTHROCYTE [DISTWIDTH] IN BLOOD: 14.7 % (ref 11–15)
FASTING DURATION TIME PATIENT: ABNORMAL H
GLOBULIN SER-MCNC: 3.1 G/DL (ref 2–4)
GLUCOSE SERPL-MCNC: 104 MG/DL (ref 70–99)
HBA1C MFR BLD: 6.5 % (ref 4.5–5.6)
HCT VFR BLD CALC: 36.9 % (ref 36–46.5)
HDLC SERPL-MCNC: 76 MG/DL
HGB BLD-MCNC: 11.1 G/DL (ref 12–15.5)
IMM GRANULOCYTES # BLD AUTO: 0 K/MCL (ref 0–0.2)
IMM GRANULOCYTES # BLD: 0 %
LDLC SERPL CALC-MCNC: 78 MG/DL
LYMPHOCYTES # BLD: 2.6 K/MCL (ref 1–4)
LYMPHOCYTES NFR BLD: 33 %
MAGNESIUM SERPL-MCNC: 2.1 MG/DL (ref 1.7–2.4)
MCH RBC QN AUTO: 22.2 PG (ref 26–34)
MCHC RBC AUTO-ENTMCNC: 30.1 G/DL (ref 32–36.5)
MCV RBC AUTO: 73.7 FL (ref 78–100)
MONOCYTES # BLD: 0.6 K/MCL (ref 0.3–0.9)
MONOCYTES NFR BLD: 7 %
NEUTROPHILS # BLD: 4.5 K/MCL (ref 1.8–7.7)
NEUTROPHILS NFR BLD: 57 %
NONHDLC SERPL-MCNC: 106 MG/DL
NRBC BLD MANUAL-RTO: 0 /100 WBC
PLATELET # BLD AUTO: 291 K/MCL (ref 140–450)
POTASSIUM SERPL-SCNC: 4.1 MMOL/L (ref 3.4–5.1)
PROT SERPL-MCNC: 6.8 G/DL (ref 6.4–8.2)
RBC # BLD: 5.01 MIL/MCL (ref 4–5.2)
SODIUM SERPL-SCNC: 142 MMOL/L (ref 135–145)
TRIGL SERPL-MCNC: 139 MG/DL
TSH SERPL-ACNC: 3.44 MCUNITS/ML (ref 0.35–5)
WBC # BLD: 8 K/MCL (ref 4.2–11)

## 2025-04-29 PROCEDURE — 82306 VITAMIN D 25 HYDROXY: CPT | Performed by: INTERNAL MEDICINE

## 2025-04-29 PROCEDURE — 83735 ASSAY OF MAGNESIUM: CPT | Performed by: INTERNAL MEDICINE

## 2025-04-29 PROCEDURE — 85025 COMPLETE CBC W/AUTO DIFF WBC: CPT | Performed by: INTERNAL MEDICINE

## 2025-04-29 PROCEDURE — 36415 COLL VENOUS BLD VENIPUNCTURE: CPT | Performed by: FAMILY MEDICINE

## 2025-04-29 PROCEDURE — 83036 HEMOGLOBIN GLYCOSYLATED A1C: CPT | Performed by: INTERNAL MEDICINE

## 2025-04-29 PROCEDURE — 80061 LIPID PANEL: CPT | Performed by: INTERNAL MEDICINE

## 2025-04-29 PROCEDURE — 80053 COMPREHEN METABOLIC PANEL: CPT | Performed by: INTERNAL MEDICINE

## 2025-04-29 PROCEDURE — 84443 ASSAY THYROID STIM HORMONE: CPT | Performed by: INTERNAL MEDICINE

## 2025-04-29 RX ORDER — ACYCLOVIR 400 MG/1
TABLET ORAL
Qty: 1 EACH | Refills: 0 | Status: SHIPPED | OUTPATIENT
Start: 2025-04-29

## 2025-04-29 RX ORDER — ACYCLOVIR 400 MG/1
TABLET ORAL
Qty: 9 EACH | Refills: 1 | Status: SHIPPED | OUTPATIENT
Start: 2025-04-29

## 2025-04-30 ENCOUNTER — TELEPHONE (OUTPATIENT)
Dept: FAMILY MEDICINE | Age: 65
End: 2025-04-30

## 2025-05-02 ENCOUNTER — HOSPITAL ENCOUNTER (OUTPATIENT)
Dept: INTERVENTIONAL RADIOLOGY/VASCULAR | Age: 65
Discharge: HOME | End: 2025-05-02
Attending: OTOLARYNGOLOGY

## 2025-05-02 DIAGNOSIS — E04.1 THYROID NODULE: ICD-10-CM

## 2025-05-02 PROCEDURE — 10002801 HB RX 250 W/O HCPCS: Performed by: STUDENT IN AN ORGANIZED HEALTH CARE EDUCATION/TRAINING PROGRAM

## 2025-05-02 PROCEDURE — 10005 FNA BX W/US GDN 1ST LES: CPT

## 2025-05-02 RX ORDER — LIDOCAINE HYDROCHLORIDE 10 MG/ML
INJECTION, SOLUTION INFILTRATION; PERINEURAL PRN
Status: COMPLETED | OUTPATIENT
Start: 2025-05-02 | End: 2025-05-02

## 2025-05-02 RX ADMIN — LIDOCAINE HYDROCHLORIDE 5 ML: 10 INJECTION, SOLUTION INFILTRATION; PERINEURAL at 13:31

## 2025-05-06 ENCOUNTER — RESULTS FOLLOW-UP (OUTPATIENT)
Dept: OTOLARYNGOLOGY | Age: 65
End: 2025-05-06

## 2025-05-06 DIAGNOSIS — E04.1 THYROID NODULE: Primary | ICD-10-CM

## 2025-05-06 LAB
ASR DISCLAIMER: NORMAL
CASE RPRT: NORMAL
CLINICAL INFO: NORMAL
PATH REPORT.FINAL DX SPEC: NORMAL
PATH REPORT.GROSS SPEC: NORMAL
PATH REPORT.MICROSCOPIC SPEC OTHER STN: NORMAL

## 2025-05-09 ENCOUNTER — APPOINTMENT (OUTPATIENT)
Dept: OPTOMETRY | Age: 65
End: 2025-05-09

## 2025-05-09 DIAGNOSIS — H04.123 TEAR FILM INSUFFICIENCY, BILATERAL: ICD-10-CM

## 2025-05-09 DIAGNOSIS — H25.13 SENILE NUCLEAR SCLEROSIS, BILATERAL: ICD-10-CM

## 2025-05-09 DIAGNOSIS — H35.372 EPIRETINAL MEMBRANE (ERM) OF LEFT EYE: ICD-10-CM

## 2025-05-09 DIAGNOSIS — E11.9 TYPE 2 DIABETES MELLITUS WITHOUT OPHTHALMIC MANIFESTATIONS (CMD): Primary | ICD-10-CM

## 2025-05-09 ASSESSMENT — REFRACTION_WEARINGRX
OD_ADD: +2.25
OS_AXIS: 180
OD_AXIS: 180
OS_SPHERE: -1.50
OD_SPHERE: -1.75
OS_CYLINDER: +0.75
OD_CYLINDER: +1.25
SPECS_TYPE: DISTANCE SINGLE VISION
OS_ADD: +2.25

## 2025-05-09 ASSESSMENT — VISUAL ACUITY
OS_PH_CC: 20/25
OS_PH_CC+: -3
OD_CC+: -1
OS_CC+: +2
METHOD: SNELLEN - LINEAR
OS_CC: JAEGER 1
OD_CC: JAEGER 1
CORRECTION_TYPE: GLASSES
OD_CC: 20/25
OS_CC: 20/40

## 2025-05-09 ASSESSMENT — EXTERNAL EXAM - LEFT EYE: OS_EXAM: NORMAL

## 2025-05-09 ASSESSMENT — TONOMETRY
OS_IOP_MMHG: 10
OD_IOP_MMHG: 11

## 2025-05-09 ASSESSMENT — CONF VISUAL FIELD
OS_INFERIOR_NASAL_RESTRICTION: 0
OS_INFERIOR_TEMPORAL_RESTRICTION: 0
OS_NORMAL: 1
OD_NORMAL: 1
OD_SUPERIOR_TEMPORAL_RESTRICTION: 0
OD_SUPERIOR_NASAL_RESTRICTION: 0
OD_INFERIOR_NASAL_RESTRICTION: 0
OS_SUPERIOR_TEMPORAL_RESTRICTION: 0
OS_SUPERIOR_NASAL_RESTRICTION: 0
OD_INFERIOR_TEMPORAL_RESTRICTION: 0

## 2025-05-09 ASSESSMENT — SLIT LAMP EXAM - LIDS
COMMENTS: DMC
COMMENTS: DMC

## 2025-05-09 ASSESSMENT — EXTERNAL EXAM - RIGHT EYE: OD_EXAM: NORMAL

## 2025-05-19 ENCOUNTER — APPOINTMENT (OUTPATIENT)
Dept: OPTOMETRY | Age: 65
End: 2025-05-19

## 2025-05-19 DIAGNOSIS — H52.4 PRESBYOPIA: ICD-10-CM

## 2025-05-19 DIAGNOSIS — H52.223 REGULAR ASTIGMATISM, BILATERAL: Primary | ICD-10-CM

## 2025-05-19 DIAGNOSIS — H52.4 PRESBYOPIA: Primary | ICD-10-CM

## 2025-05-19 PROCEDURE — 92015 DETERMINE REFRACTIVE STATE: CPT

## 2025-05-19 PROCEDURE — 92014 COMPRE OPH EXAM EST PT 1/>: CPT

## 2025-05-19 ASSESSMENT — REFRACTION_WEARINGRX
OS_CYLINDER: +0.75
SPECS_TYPE: NEAR SINGLE VISION
OD_CYLINDER: +1.25
OD_AXIS: 179
OS_CYLINDER: +0.75
OD_SPHERE: +0.50
OS_AXIS: 002
OS_AXIS: 180
OD_CYLINDER: +1.50
OS_SPHERE: -1.50
OD_SPHERE: -2.00
OS_SPHERE: +0.75
OD_AXIS: 179
SPECS_TYPE: DISTANCE SINGLE VISION

## 2025-05-19 ASSESSMENT — CONF VISUAL FIELD
OS_INFERIOR_TEMPORAL_RESTRICTION: 0
OS_SUPERIOR_TEMPORAL_RESTRICTION: 0
OD_NORMAL: 1
OS_NORMAL: 1
OD_INFERIOR_NASAL_RESTRICTION: 0
OS_INFERIOR_NASAL_RESTRICTION: 0
OS_SUPERIOR_NASAL_RESTRICTION: 0
OD_SUPERIOR_NASAL_RESTRICTION: 0
OD_INFERIOR_TEMPORAL_RESTRICTION: 0
OD_SUPERIOR_TEMPORAL_RESTRICTION: 0

## 2025-05-19 ASSESSMENT — VISUAL ACUITY
CORRECTION_TYPE: GLASSES
OS_PH_CC: 20/25
OD_CC: JAEGER 2
OS_CC: 20/40
OD_CC: 20/30
OS_PH_CC+: -3
OS_CC+: -2
METHOD: SNELLEN - LINEAR

## 2025-05-19 ASSESSMENT — TONOMETRY
OS_IOP_MMHG: 11
OD_IOP_MMHG: 12

## 2025-05-19 ASSESSMENT — REFRACTION_MANIFEST
OD_AXIS: 175
OD_CYLINDER: +1.50
OD_CYLINDER: +1.75
OS_AXIS: 178
OS_ADD: +2.50
OD_ADD: +2.50
OS_CYLINDER: +2.00
METHOD_AUTOREFRACTION: 1
OS_CYLINDER: +1.75
OS_SPHERE: -2.00
OS_SPHERE: -2.50
OS_AXIS: 180
OD_AXIS: 175
OD_SPHERE: -1.25
OD_SPHERE: -1.75

## 2025-05-19 ASSESSMENT — KERATOMETRY
OD_K1POWER_DIOPTERS: 44
OD_AXISANGLE2_DEGREES: 103
OD_AXISANGLE_DEGREES: 13
OS_K1POWER_DIOPTERS: 44.50
OD_K2POWER_DIOPTERS: 44.75
METHOD_AUTO_MANUAL: AUTOMATED
OS_K2POWER_DIOPTERS: 45
OS_AXISANGLE2_DEGREES: 86
OS_AXISANGLE_DEGREES: 176

## 2025-05-19 ASSESSMENT — EXTERNAL EXAM - RIGHT EYE: OD_EXAM: NORMAL

## 2025-05-19 ASSESSMENT — SLIT LAMP EXAM - LIDS
COMMENTS: DMC
COMMENTS: DMC

## 2025-05-19 ASSESSMENT — EXTERNAL EXAM - LEFT EYE: OS_EXAM: NORMAL

## 2025-05-23 ENCOUNTER — HOSPITAL ENCOUNTER (OUTPATIENT)
Dept: GASTROENTEROLOGY | Age: 65
Discharge: HOME | End: 2025-05-23
Attending: INTERNAL MEDICINE

## 2025-05-23 ENCOUNTER — HOSPITAL ENCOUNTER (OUTPATIENT)
Dept: GENERAL RADIOLOGY | Age: 65
Discharge: HOME | End: 2025-05-23
Attending: INTERNAL MEDICINE

## 2025-05-23 ENCOUNTER — ANESTHESIA (OUTPATIENT)
Dept: GASTROENTEROLOGY | Age: 65
End: 2025-05-23

## 2025-05-23 ENCOUNTER — ANESTHESIA EVENT (OUTPATIENT)
Dept: GASTROENTEROLOGY | Age: 65
End: 2025-05-23

## 2025-05-23 VITALS
OXYGEN SATURATION: 98 % | WEIGHT: 158.29 LBS | RESPIRATION RATE: 17 BRPM | HEART RATE: 68 BPM | TEMPERATURE: 97.3 F | SYSTOLIC BLOOD PRESSURE: 149 MMHG | HEIGHT: 63 IN | DIASTOLIC BLOOD PRESSURE: 85 MMHG | BODY MASS INDEX: 28.05 KG/M2

## 2025-05-23 VITALS
OXYGEN SATURATION: 95 % | TEMPERATURE: 97.7 F | BODY MASS INDEX: 28.05 KG/M2 | WEIGHT: 158.29 LBS | HEIGHT: 63 IN | RESPIRATION RATE: 16 BRPM | HEART RATE: 63 BPM | DIASTOLIC BLOOD PRESSURE: 74 MMHG | SYSTOLIC BLOOD PRESSURE: 136 MMHG

## 2025-05-23 DIAGNOSIS — K21.9 GERD WITHOUT ESOPHAGITIS: ICD-10-CM

## 2025-05-23 LAB — GLUCOSE BLDC GLUCOMTR-MCNC: 108 MG/DL (ref 70–99)

## 2025-05-23 PROCEDURE — 74220 X-RAY XM ESOPHAGUS 1CNTRST: CPT

## 2025-05-23 PROCEDURE — 13000024 HB GI COMPLEX CASE S/U + 1ST 15 MIN

## 2025-05-23 PROCEDURE — 13000001 HB PHASE II RECOVERY EA 30 MINUTES

## 2025-05-23 PROCEDURE — 10006023 HB SUPPLY 272

## 2025-05-23 PROCEDURE — 10002807 HB RX 258: Performed by: ANESTHESIOLOGY

## 2025-05-23 PROCEDURE — 82962 GLUCOSE BLOOD TEST: CPT

## 2025-05-23 PROCEDURE — 13000008 HB ANESTHESIA MAC OUTSIDE OR

## 2025-05-23 PROCEDURE — 10002801 HB RX 250 W/O HCPCS: Performed by: INTERNAL MEDICINE

## 2025-05-23 PROCEDURE — 10004451 HB PACU RECOVERY 1ST 30 MINUTES

## 2025-05-23 PROCEDURE — 10002807 HB RX 258: Performed by: INTERNAL MEDICINE

## 2025-05-23 PROCEDURE — 10002800 HB RX 250 W HCPCS: Performed by: ANESTHESIOLOGY

## 2025-05-23 RX ORDER — SODIUM CHLORIDE 9 MG/ML
INJECTION, SOLUTION INTRAVENOUS CONTINUOUS
Status: DISCONTINUED | OUTPATIENT
Start: 2025-05-23 | End: 2025-05-23

## 2025-05-23 RX ORDER — SODIUM CHLORIDE 9 MG/ML
INJECTION, SOLUTION INTRAVENOUS CONTINUOUS PRN
Status: DISCONTINUED | OUTPATIENT
Start: 2025-05-23 | End: 2025-05-23

## 2025-05-23 RX ORDER — MAGNESIUM HYDROXIDE 1200 MG/15ML
LIQUID ORAL PRN
Status: COMPLETED | OUTPATIENT
Start: 2025-05-23 | End: 2025-05-23

## 2025-05-23 RX ORDER — LIDOCAINE HYDROCHLORIDE 20 MG/ML
JELLY TOPICAL PRN
Status: COMPLETED | OUTPATIENT
Start: 2025-05-23 | End: 2025-05-23

## 2025-05-23 RX ADMIN — LIDOCAINE HYDROCHLORIDE 10 ML: 20 JELLY TOPICAL at 11:20

## 2025-05-23 RX ADMIN — SODIUM CHLORIDE 80 ML: 900 IRRIGANT IRRIGATION at 11:54

## 2025-05-23 RX ADMIN — PROPOFOL 100 MCG/KG/MIN: 10 INJECTION, EMULSION INTRAVENOUS at 12:36

## 2025-05-23 RX ADMIN — SODIUM CHLORIDE: 9 INJECTION, SOLUTION INTRAVENOUS at 12:27

## 2025-05-23 RX ADMIN — SODIUM CHLORIDE: 9 INJECTION, SOLUTION INTRAVENOUS at 10:17

## 2025-05-23 SDOH — SOCIAL STABILITY: SOCIAL INSECURITY: HOW OFTEN DOES ANYONE, INCLUDING FAMILY AND FRIENDS, INSULT OR TALK DOWN TO YOU?: NEVER

## 2025-05-23 SDOH — SOCIAL STABILITY: SOCIAL INSECURITY: HOW OFTEN DOES ANYONE, INCLUDING FAMILY AND FRIENDS, SCREAM OR CURSE AT YOU?: NEVER

## 2025-05-23 SDOH — SOCIAL STABILITY: SOCIAL NETWORK
HOW OFTEN DO YOU SEE OR TALK TO PEOPLE THAT YOU CARE ABOUT AND FEEL CLOSE TO? (FOR EXAMPLE: TALKING TO FRIENDS ON THE PHONE, VISITING FRIENDS OR FAMILY, GOING TO CHURCH OR CLUB MEETINGS): 5 OR MORE TIMES A WEEK

## 2025-05-23 SDOH — SOCIAL STABILITY: SOCIAL INSECURITY: HOW OFTEN DOES ANYONE, INCLUDING FAMILY AND FRIENDS, PHYSICALLY HURT YOU?: NEVER

## 2025-05-23 SDOH — SOCIAL STABILITY: SOCIAL INSECURITY: HOW OFTEN DOES ANYONE, INCLUDING FAMILY AND FRIENDS, THREATEN YOU WITH HARM?: NEVER

## 2025-05-23 ASSESSMENT — ACTIVITIES OF DAILY LIVING (ADL)
RECENT_DECLINE_ADL: NO
ADL_SHORT_OF_BREATH: NO
ADL_SCORE: 12
RECENT_DECLINE_ADL: NO
ADL_BEFORE_ADMISSION: INDEPENDENT
ADL_SCORE: 12
ADL_SHORT_OF_BREATH: NO
ADL_BEFORE_ADMISSION: INDEPENDENT

## 2025-05-23 ASSESSMENT — LIFESTYLE VARIABLES
HOW OFTEN DO YOU HAVE 6 OR MORE DRINKS ON ONE OCCASION: NEVER
HOW OFTEN DO YOU HAVE A DRINK CONTAINING ALCOHOL: MONTHLY OR LESS
AUDIT-C TOTAL SCORE: 1
ALCOHOL_USE_STATUS: NO OR LOW RISK WITH VALIDATED TOOL
AUDIT-C TOTAL SCORE: 1
HOW MANY STANDARD DRINKS CONTAINING ALCOHOL DO YOU HAVE ON A TYPICAL DAY: 0,1 OR 2
HOW OFTEN DO YOU HAVE A DRINK CONTAINING ALCOHOL: MONTHLY OR LESS
HOW OFTEN DO YOU HAVE 6 OR MORE DRINKS ON ONE OCCASION: NEVER
HOW MANY STANDARD DRINKS CONTAINING ALCOHOL DO YOU HAVE ON A TYPICAL DAY: 0,1 OR 2
ALCOHOL_USE_STATUS: NO OR LOW RISK WITH VALIDATED TOOL

## 2025-05-23 ASSESSMENT — PAIN SCALES - GENERAL
PAINLEVEL_OUTOF10: 0
PAINLEVEL_OUTOF10: 2
PAINLEVEL_OUTOF10: 0

## 2025-05-23 ASSESSMENT — PAIN SCALES - WONG BAKER: WONGBAKER_NUMERICALRESPONSE: 0

## 2025-05-28 ENCOUNTER — RESULTS FOLLOW-UP (OUTPATIENT)
Dept: GASTROENTEROLOGY | Age: 65
End: 2025-05-28

## 2025-05-29 ENCOUNTER — TELEPHONE (OUTPATIENT)
Dept: GASTROENTEROLOGY | Age: 65
End: 2025-05-29

## 2025-06-05 ENCOUNTER — APPOINTMENT (OUTPATIENT)
Dept: MAMMOGRAPHY | Age: 65
End: 2025-06-05

## 2025-06-05 ENCOUNTER — APPOINTMENT (OUTPATIENT)
Dept: MAMMOGRAPHY | Age: 65
End: 2025-06-05
Attending: FAMILY MEDICINE

## 2025-06-05 ENCOUNTER — APPOINTMENT (OUTPATIENT)
Dept: BONE DENSITY | Age: 65
End: 2025-06-05
Attending: FAMILY MEDICINE

## 2025-06-05 DIAGNOSIS — Z12.31 ENCOUNTER FOR SCREENING MAMMOGRAM FOR MALIGNANT NEOPLASM OF BREAST: ICD-10-CM

## 2025-06-05 PROCEDURE — 77067 SCR MAMMO BI INCL CAD: CPT | Performed by: RADIOLOGY

## 2025-06-05 PROCEDURE — 77063 BREAST TOMOSYNTHESIS BI: CPT | Performed by: RADIOLOGY

## 2025-06-06 ENCOUNTER — APPOINTMENT (OUTPATIENT)
Dept: BONE DENSITY | Age: 65
End: 2025-06-06
Attending: FAMILY MEDICINE

## 2025-06-08 ENCOUNTER — RESULTS FOLLOW-UP (OUTPATIENT)
Dept: GASTROENTEROLOGY | Age: 65
End: 2025-06-08

## 2025-06-08 DIAGNOSIS — K21.9 GERD WITHOUT ESOPHAGITIS: Primary | ICD-10-CM

## 2025-06-08 DIAGNOSIS — K20.90 ESOPHAGITIS: ICD-10-CM

## 2025-06-09 ENCOUNTER — TELEPHONE (OUTPATIENT)
Dept: GASTROENTEROLOGY | Age: 65
End: 2025-06-09

## 2025-06-12 RX ORDER — SOLIFENACIN SUCCINATE 5 MG/1
5 TABLET, FILM COATED ORAL DAILY
Qty: 30 TABLET | Refills: 1 | Status: SHIPPED | OUTPATIENT
Start: 2025-06-12

## 2025-06-20 RX ORDER — PANTOPRAZOLE SODIUM 40 MG/1
40 TABLET, DELAYED RELEASE ORAL 2 TIMES DAILY
Qty: 90 TABLET | Refills: 3 | Status: SHIPPED | OUTPATIENT
Start: 2025-06-20 | End: 2025-12-17

## 2025-06-20 RX ORDER — DEXLANSOPRAZOLE 60 MG/1
60 CAPSULE, DELAYED RELEASE ORAL DAILY
Qty: 90 CAPSULE | Refills: 3 | Status: SHIPPED | OUTPATIENT
Start: 2025-06-20 | End: 2025-06-20 | Stop reason: ALTCHOICE

## 2025-07-10 ENCOUNTER — APPOINTMENT (OUTPATIENT)
Dept: BONE DENSITY | Age: 65
End: 2025-07-10
Attending: FAMILY MEDICINE

## 2025-07-10 DIAGNOSIS — Z13.820 SCREENING FOR OSTEOPOROSIS: ICD-10-CM

## 2025-07-10 PROCEDURE — 77080 DXA BONE DENSITY AXIAL: CPT | Performed by: RADIOLOGY

## 2025-07-14 ENCOUNTER — E-ADVICE (OUTPATIENT)
Dept: GASTROENTEROLOGY | Age: 65
End: 2025-07-14

## 2025-07-14 DIAGNOSIS — R10.13 EPIGASTRIC PAIN: Primary | ICD-10-CM

## 2025-07-15 ENCOUNTER — E-ADVICE (OUTPATIENT)
Dept: BARIATRICS/WEIGHT MGMT | Age: 65
End: 2025-07-15

## 2025-07-15 ENCOUNTER — APPOINTMENT (OUTPATIENT)
Dept: PULMONOLOGY | Age: 65
End: 2025-07-15

## 2025-07-15 VITALS
HEIGHT: 62 IN | BODY MASS INDEX: 28.6 KG/M2 | WEIGHT: 155.42 LBS | HEART RATE: 95 BPM | TEMPERATURE: 97.1 F | RESPIRATION RATE: 16 BRPM | SYSTOLIC BLOOD PRESSURE: 114 MMHG | OXYGEN SATURATION: 96 % | DIASTOLIC BLOOD PRESSURE: 72 MMHG

## 2025-07-15 DIAGNOSIS — J47.9 BRONCHIECTASIS WITHOUT COMPLICATION  (CMD): ICD-10-CM

## 2025-07-15 DIAGNOSIS — J45.40 MODERATE PERSISTENT ASTHMA WITHOUT COMPLICATION (CMD): Primary | ICD-10-CM

## 2025-07-15 DIAGNOSIS — A31.0 MAI (MYCOBACTERIUM AVIUM-INTRACELLULARE)  (CMD): ICD-10-CM

## 2025-07-15 PROCEDURE — 3078F DIAST BP <80 MM HG: CPT | Performed by: INTERNAL MEDICINE

## 2025-07-15 PROCEDURE — 3074F SYST BP LT 130 MM HG: CPT | Performed by: INTERNAL MEDICINE

## 2025-07-15 PROCEDURE — 99214 OFFICE O/P EST MOD 30 MIN: CPT | Performed by: INTERNAL MEDICINE

## 2025-07-15 RX ORDER — DICYCLOMINE HCL 20 MG
20 TABLET ORAL
Qty: 120 TABLET | Refills: 1 | Status: SHIPPED | OUTPATIENT
Start: 2025-07-15

## 2025-07-15 RX ORDER — MONTELUKAST SODIUM 10 MG/1
10 TABLET ORAL EVERY EVENING
Qty: 90 TABLET | Refills: 0 | Status: SHIPPED | OUTPATIENT
Start: 2025-07-15

## 2025-07-15 RX ORDER — CETIRIZINE HYDROCHLORIDE 10 MG/1
10 TABLET ORAL DAILY
Qty: 90 TABLET | Refills: 1 | Status: SHIPPED | OUTPATIENT
Start: 2025-07-15

## 2025-07-15 RX ORDER — ALBUTEROL SULFATE 1.25 MG/3ML
1.25 SOLUTION RESPIRATORY (INHALATION) EVERY 6 HOURS PRN
Qty: 75 ML | Refills: 1 | Status: SHIPPED | OUTPATIENT
Start: 2025-07-15

## 2025-07-15 RX ORDER — FLUTICASONE FUROATE AND VILANTEROL 200; 25 UG/1; UG/1
1 POWDER RESPIRATORY (INHALATION) DAILY
Qty: 3 EACH | Refills: 1 | Status: SHIPPED | OUTPATIENT
Start: 2025-07-15

## 2025-07-15 RX ORDER — ALBUTEROL SULFATE 90 UG/1
2 INHALANT RESPIRATORY (INHALATION) EVERY 4 HOURS PRN
Qty: 2 EACH | Refills: 1 | Status: SHIPPED | OUTPATIENT
Start: 2025-07-15

## 2025-07-16 ENCOUNTER — TELEPHONE (OUTPATIENT)
Dept: BARIATRICS/WEIGHT MGMT | Age: 65
End: 2025-07-16

## 2025-07-17 ENCOUNTER — IMAGING SERVICES (OUTPATIENT)
Dept: ULTRASOUND IMAGING | Age: 65
End: 2025-07-17
Attending: FAMILY MEDICINE

## 2025-07-17 DIAGNOSIS — N64.89 OTHER SPECIFIED DISORDERS OF BREAST: ICD-10-CM

## 2025-07-17 DIAGNOSIS — Z12.39 OTHER SCREENING BREAST EXAMINATION: ICD-10-CM

## 2025-07-17 DIAGNOSIS — R92.30 INCONCLUSIVE MAMMOGRAPHY DUE TO DENSE BREASTS: ICD-10-CM

## 2025-07-17 DIAGNOSIS — R92.2 INCONCLUSIVE MAMMOGRAPHY DUE TO DENSE BREASTS: ICD-10-CM

## 2025-07-17 PROCEDURE — 76641 ULTRASOUND BREAST COMPLETE: CPT | Performed by: RADIOLOGY

## 2025-07-23 ENCOUNTER — TELEPHONE (OUTPATIENT)
Dept: BARIATRICS/WEIGHT MGMT | Age: 65
End: 2025-07-23

## 2025-07-29 ENCOUNTER — TELEPHONE (OUTPATIENT)
Dept: BARIATRICS/WEIGHT MGMT | Age: 65
End: 2025-07-29

## 2025-07-31 ENCOUNTER — APPOINTMENT (OUTPATIENT)
Dept: ULTRASOUND IMAGING | Age: 65
End: 2025-07-31

## 2025-07-31 DIAGNOSIS — R10.13 EPIGASTRIC PAIN: ICD-10-CM

## 2025-07-31 PROCEDURE — 76705 ECHO EXAM OF ABDOMEN: CPT | Performed by: RADIOLOGY

## 2025-08-01 ENCOUNTER — TELEPHONE (OUTPATIENT)
Dept: BARIATRICS/WEIGHT MGMT | Age: 65
End: 2025-08-01

## 2025-08-01 ENCOUNTER — APPOINTMENT (OUTPATIENT)
Dept: PULMONOLOGY | Age: 65
End: 2025-08-01

## 2025-08-07 RX ORDER — SOLIFENACIN SUCCINATE 5 MG/1
5 TABLET, FILM COATED ORAL DAILY
Qty: 30 TABLET | Refills: 1 | Status: SHIPPED | OUTPATIENT
Start: 2025-08-07

## 2025-08-14 ENCOUNTER — TELEPHONE (OUTPATIENT)
Dept: BARIATRICS/WEIGHT MGMT | Age: 65
End: 2025-08-14

## 2025-08-21 ENCOUNTER — TELEPHONE (OUTPATIENT)
Dept: BARIATRICS/WEIGHT MGMT | Age: 65
End: 2025-08-21

## 2025-09-04 RX ORDER — SOLIFENACIN SUCCINATE 5 MG/1
5 TABLET, FILM COATED ORAL DAILY
Qty: 90 TABLET | Refills: 1 | Status: SHIPPED | OUTPATIENT
Start: 2025-09-04

## 2025-09-19 ENCOUNTER — APPOINTMENT (OUTPATIENT)
Dept: GASTROENTEROLOGY | Age: 65
End: 2025-09-19

## 2025-11-06 ENCOUNTER — APPOINTMENT (OUTPATIENT)
Dept: FAMILY MEDICINE | Age: 65
End: 2025-11-06

## 2026-01-16 ENCOUNTER — APPOINTMENT (OUTPATIENT)
Dept: PULMONOLOGY | Age: 66
End: 2026-01-16

## 2026-01-29 ENCOUNTER — APPOINTMENT (OUTPATIENT)
Dept: AUDIOLOGY | Age: 66
End: 2026-01-29

## (undated) NOTE — LETTER
ASTHMA ACTION PLAN for Jamshid Grant     : 1960     Date: 2/15/2018  Provider:  Iker Conde MD  Phone for doctor or clinic: [facility], [address] [place] [address], [address] [place] [address], [address]  [address] [place] [address] [phone]

## (undated) NOTE — LETTER
ASTHMA ACTION PLAN for Bebe Fischer     : 1960     Date: 2017  Provider:  Trae Pollack MD  Phone for doctor or clinic: [facility], [address] [facility], [address] [place] [address] [address], [address] [place] [address]